# Patient Record
Sex: FEMALE | Race: WHITE | NOT HISPANIC OR LATINO | Employment: FULL TIME | URBAN - NONMETROPOLITAN AREA
[De-identification: names, ages, dates, MRNs, and addresses within clinical notes are randomized per-mention and may not be internally consistent; named-entity substitution may affect disease eponyms.]

---

## 2017-11-27 ENCOUNTER — TRANSCRIBE ORDERS (OUTPATIENT)
Dept: ADMINISTRATIVE | Facility: HOSPITAL | Age: 47
End: 2017-11-27

## 2017-11-27 DIAGNOSIS — R92.8 ABNORMAL MAMMOGRAM: Primary | ICD-10-CM

## 2017-12-20 ENCOUNTER — HOSPITAL ENCOUNTER (OUTPATIENT)
Dept: ULTRASOUND IMAGING | Facility: HOSPITAL | Age: 47
Discharge: HOME | End: 2017-12-20

## 2017-12-20 ENCOUNTER — HOSPITAL ENCOUNTER (OUTPATIENT)
Dept: MAMMOGRAPHY | Facility: HOSPITAL | Age: 47
Discharge: HOME | End: 2017-12-20
Admitting: ADVANCED PRACTICE MIDWIFE

## 2017-12-20 DIAGNOSIS — R92.8 ABNORMAL MAMMOGRAM: ICD-10-CM

## 2017-12-20 PROCEDURE — 76642 ULTRASOUND BREAST LIMITED: CPT

## 2017-12-20 PROCEDURE — 76642 ULTRASOUND BREAST LIMITED: CPT | Performed by: RADIOLOGY

## 2017-12-20 PROCEDURE — G0279 TOMOSYNTHESIS, MAMMO: HCPCS

## 2017-12-20 PROCEDURE — G0206 DX MAMMO INCL CAD UNI: HCPCS

## 2017-12-20 PROCEDURE — 77061 BREAST TOMOSYNTHESIS UNI: CPT | Performed by: RADIOLOGY

## 2017-12-20 PROCEDURE — 77065 DX MAMMO INCL CAD UNI: CPT | Performed by: RADIOLOGY

## 2018-01-05 ENCOUNTER — OFFICE VISIT (OUTPATIENT)
Dept: RETAIL CLINIC | Facility: CLINIC | Age: 48
End: 2018-01-05

## 2018-01-05 VITALS
HEART RATE: 74 BPM | OXYGEN SATURATION: 99 % | TEMPERATURE: 98 F | WEIGHT: 219 LBS | HEIGHT: 67 IN | RESPIRATION RATE: 16 BRPM | BODY MASS INDEX: 34.37 KG/M2

## 2018-01-05 DIAGNOSIS — J01.10 ACUTE NON-RECURRENT FRONTAL SINUSITIS: Primary | ICD-10-CM

## 2018-01-05 PROBLEM — J06.9 URI (UPPER RESPIRATORY INFECTION): Status: ACTIVE | Noted: 2018-01-05

## 2018-01-05 PROCEDURE — 99213 OFFICE O/P EST LOW 20 MIN: CPT | Performed by: NURSE PRACTITIONER

## 2018-01-05 RX ORDER — CETIRIZINE HYDROCHLORIDE, PSEUDOEPHEDRINE HYDROCHLORIDE 5; 120 MG/1; MG/1
1 TABLET, FILM COATED, EXTENDED RELEASE ORAL 2 TIMES DAILY
Qty: 14 TABLET | Refills: 0 | Status: SHIPPED | OUTPATIENT
Start: 2018-01-05 | End: 2018-01-12

## 2018-01-05 RX ORDER — AZELASTINE 1 MG/ML
1 SPRAY, METERED NASAL 2 TIMES DAILY
Qty: 1 EACH | Refills: 0 | OUTPATIENT
Start: 2018-01-05 | End: 2019-08-24

## 2018-01-05 RX ORDER — FLUCONAZOLE 150 MG/1
150 TABLET ORAL DAILY
Qty: 2 TABLET | Refills: 0 | OUTPATIENT
Start: 2018-01-05 | End: 2019-08-24

## 2018-01-05 RX ORDER — AMOXICILLIN AND CLAVULANATE POTASSIUM 875; 125 MG/1; MG/1
1 TABLET, FILM COATED ORAL 2 TIMES DAILY
Qty: 20 TABLET | Refills: 0 | Status: SHIPPED | OUTPATIENT
Start: 2018-01-05 | End: 2019-05-09

## 2018-01-05 RX ORDER — IBUPROFEN 800 MG/1
800 TABLET ORAL 3 TIMES DAILY PRN
Qty: 30 TABLET | Refills: 0 | Status: SHIPPED | OUTPATIENT
Start: 2018-01-05

## 2018-01-05 NOTE — PATIENT INSTRUCTIONS
Sinusitis, Adult  Sinusitis is soreness and inflammation of your sinuses. Sinuses are hollow spaces in the bones around your face. They are located:  · Around your eyes.  · In the middle of your forehead.  · Behind your nose.  · In your cheekbones.  Your sinuses and nasal passages are lined with a stringy fluid (mucus). Mucus normally drains out of your sinuses. When your nasal tissues get inflamed or swollen, the mucus can get trapped or blocked so air cannot flow through your sinuses. This lets bacteria, viruses, and funguses grow, and that leads to infection.  HOME CARE  Medicines  · Take, use, or apply over-the-counter and prescription medicines only as told by your doctor. These may include nasal sprays.  · If you were prescribed an antibiotic medicine, take it as told by your doctor. Do not stop taking the antibiotic even if you start to feel better.  Hydrate and Humidify  · Drink enough water to keep your pee (urine) clear or pale yellow.  · Use a cool mist humidifier to keep the humidity level in your home above 50%.  · Breathe in steam for 10-15 minutes, 3-4 times a day or as told by your doctor. You can do this in the bathroom while a hot shower is running.  · Try not to spend time in cool or dry air.  Rest  · Rest as much as possible.    · Sleep with your head raised (elevated).  · Make sure to get enough sleep each night.  General Instructions  · Put a warm, moist washcloth on your face 3-4 times a day or as told by your doctor. This will help with discomfort.  · Wash your hands often with soap and water. If there is no soap and water, use hand .  · Do not smoke. Avoid being around people who are smoking (secondhand smoke).  · Keep all follow-up visits as told by your doctor. This is important.  GET HELP IF:  · You have a fever.  · Your symptoms get worse.  · Your symptoms do not get better within 10 days.  GET HELP RIGHT AWAY IF:  · You have a very bad headache.  · You cannot stop throwing up  (vomiting).  · You have pain or swelling around your face or eyes.  · You have trouble seeing.  · You feel confused.  · Your neck is stiff.  · You have trouble breathing.     This information is not intended to replace advice given to you by your health care provider. Make sure you discuss any questions you have with your health care provider.     Document Released: 06/05/2009 Document Revised: 04/10/2017 Document Reviewed: 10/12/2016  [employer] Interactive Patient Education ©2017 [employer]

## 2018-01-05 NOTE — PROGRESS NOTES
"  HPI Comments: Debbie presents to the clinic today C/O upper respiratory symptoms which started appx one week ago. Associated symptoms include sinus pressure/pain, nasal congestion and swelling of her right lower jaw which she noticed yesterday. She has tried switching from Zyrtec to Claritin without adequate relief. Refer to ROS for additional information.    URI    The current episode started in the past 7 days. The problem has been gradually worsening. There has been no fever. Associated symptoms include congestion, ear pain, rhinorrhea and sinus pain. Pertinent negatives include no coughing, headaches, nausea, rash, sore throat, vomiting or wheezing. She has tried antihistamine for the symptoms. The treatment provided no relief.      Vitals:    01/05/18 0900   Pulse: 74   Resp: 16   Temp: 98 °F (36.7 °C)   TempSrc: Oral   SpO2: 99%   Weight: 99.3 kg (219 lb)   Height: 170.2 cm (67\")      The following portions of the patient's history were reviewed and updated as appropriate: allergies, current medications, past family history, past medical history, past social history, past surgical history and problem list.    Review of Systems   Constitutional: Negative for appetite change, chills, fatigue and fever.   HENT: Positive for congestion, ear pain, postnasal drip, rhinorrhea, sinus pain and sinus pressure. Negative for sore throat.    Eyes: Negative for discharge and redness.   Respiratory: Negative for cough, chest tightness and wheezing.    Gastrointestinal: Negative for nausea and vomiting.   Musculoskeletal: Negative for neck stiffness.   Skin: Negative for color change and rash.   Neurological: Negative for dizziness, light-headedness and headaches.   Hematological: Positive for adenopathy.   All other systems reviewed and are negative.    Physical Exam   Constitutional: She is oriented to person, place, and time. She appears well-developed and well-nourished. No distress.   HENT:   Head: Normocephalic. "   Right Ear: Ear canal normal. No mastoid tenderness. Tympanic membrane is bulging. Tympanic membrane is not erythematous. A middle ear effusion is present.   Left Ear: Ear canal normal. No mastoid tenderness. Tympanic membrane is bulging. Tympanic membrane is not erythematous. A middle ear effusion is present.   Nose: Mucosal edema, rhinorrhea and sinus tenderness present. Right sinus exhibits frontal sinus tenderness. Right sinus exhibits no maxillary sinus tenderness. Left sinus exhibits frontal sinus tenderness. Left sinus exhibits no maxillary sinus tenderness.   Mouth/Throat: No oropharyngeal exudate or posterior oropharyngeal erythema.   Eyes: Conjunctivae are normal. Pupils are equal, round, and reactive to light. Right eye exhibits no discharge. Left eye exhibits no discharge. No scleral icterus.   Neck: Neck supple. No tracheal tenderness present.   Cardiovascular: Normal rate, regular rhythm and normal heart sounds.  Exam reveals no friction rub.    No murmur heard.  Pulmonary/Chest: Effort normal and breath sounds normal. No respiratory distress. She has no wheezes. She has no rales.   Lymphadenopathy:        Head (right side): Submandibular adenopathy present. No tonsillar and no preauricular adenopathy present.        Head (left side): No tonsillar and no preauricular adenopathy present.     She has no cervical adenopathy.   Neurological: She is alert and oriented to person, place, and time.   Skin: Skin is warm and dry. No rash noted. No erythema.   Psychiatric: She has a normal mood and affect. Her behavior is normal. Judgment and thought content normal.   Vitals reviewed.      Assessment/Plan   Problems Addressed this Visit     None      Visit Diagnoses     Acute non-recurrent frontal sinusitis    -  Primary    Findings and recommendations discussed with Debbie. Treatment options reviewed. Counseled regarding supportive care measures.     Relevant Medications    amoxicillin-clavulanate (AUGMENTIN)  875-125 MG per tablet    ibuprofen (ADVIL,MOTRIN) 800 MG tablet    azelastine (ASTELIN) 0.1 % nasal spray    cetirizine-pseudoephedrine (ZyrTEC-D) 5-120 MG per 12 hr tablet        Findings and recommendations discussed with Debbie. Treatment options reviewed. Counseled regarding supportive care measures. S/S of concern reviewed and if occur to seek further medical evaluation or if symptoms worsen or do not improve within 48-72 hours.

## 2018-08-22 ENCOUNTER — TRANSCRIBE ORDERS (OUTPATIENT)
Dept: ADMINISTRATIVE | Facility: HOSPITAL | Age: 48
End: 2018-08-22

## 2018-08-22 DIAGNOSIS — R10.9 ABDOMINAL PAIN, UNSPECIFIED ABDOMINAL LOCATION: Primary | ICD-10-CM

## 2018-08-24 ENCOUNTER — TRANSCRIBE ORDERS (OUTPATIENT)
Dept: ADMINISTRATIVE | Facility: HOSPITAL | Age: 48
End: 2018-08-24

## 2018-08-24 ENCOUNTER — HOSPITAL ENCOUNTER (OUTPATIENT)
Dept: ULTRASOUND IMAGING | Facility: HOSPITAL | Age: 48
Discharge: HOME | End: 2018-08-24
Admitting: NURSE PRACTITIONER

## 2018-08-24 DIAGNOSIS — R10.9 ABDOMINAL PAIN, UNSPECIFIED ABDOMINAL LOCATION: Primary | ICD-10-CM

## 2018-08-24 DIAGNOSIS — R10.9 ABDOMINAL PAIN, UNSPECIFIED ABDOMINAL LOCATION: ICD-10-CM

## 2018-08-24 PROCEDURE — 76700 US EXAM ABDOM COMPLETE: CPT | Performed by: RADIOLOGY

## 2018-08-24 PROCEDURE — 76700 US EXAM ABDOM COMPLETE: CPT

## 2018-08-26 ENCOUNTER — HOSPITAL ENCOUNTER (OUTPATIENT)
Dept: NUCLEAR MEDICINE | Facility: HOSPITAL | Age: 48
Discharge: HOME | End: 2018-08-26

## 2018-08-26 DIAGNOSIS — R10.9 ABDOMINAL PAIN, UNSPECIFIED ABDOMINAL LOCATION: ICD-10-CM

## 2018-08-26 PROCEDURE — A9537 TC99M MEBROFENIN: HCPCS | Performed by: NURSE PRACTITIONER

## 2018-08-26 PROCEDURE — 78226 HEPATOBILIARY SYSTEM IMAGING: CPT | Performed by: RADIOLOGY

## 2018-08-26 PROCEDURE — 0 TECHNETIUM TC 99M MEBROFENIN KIT: Performed by: NURSE PRACTITIONER

## 2018-08-26 PROCEDURE — 78226 HEPATOBILIARY SYSTEM IMAGING: CPT

## 2018-08-26 RX ORDER — KIT FOR THE PREPARATION OF TECHNETIUM TC 99M MEBROFENIN 45 MG/10ML
1 INJECTION, POWDER, LYOPHILIZED, FOR SOLUTION INTRAVENOUS
Status: COMPLETED | OUTPATIENT
Start: 2018-08-26 | End: 2018-08-26

## 2018-08-26 RX ADMIN — MEBROFENIN 1 DOSE: 45 INJECTION, POWDER, LYOPHILIZED, FOR SOLUTION INTRAVENOUS at 16:11

## 2019-05-09 ENCOUNTER — OFFICE VISIT (OUTPATIENT)
Dept: RETAIL CLINIC | Facility: CLINIC | Age: 49
End: 2019-05-09

## 2019-05-09 VITALS
HEART RATE: 74 BPM | TEMPERATURE: 98 F | OXYGEN SATURATION: 98 % | BODY MASS INDEX: 34.27 KG/M2 | RESPIRATION RATE: 18 BRPM | WEIGHT: 218.8 LBS

## 2019-05-09 DIAGNOSIS — N30.90 CYSTITIS: Primary | ICD-10-CM

## 2019-05-09 LAB
BILIRUB BLD-MCNC: NEGATIVE MG/DL
CLARITY, POC: CLEAR
COLOR UR: YELLOW
GLUCOSE UR STRIP-MCNC: NEGATIVE MG/DL
KETONES UR QL: NEGATIVE
LEUKOCYTE EST, POC: NEGATIVE
NITRITE UR-MCNC: NEGATIVE MG/ML
PH UR: 5.5 [PH] (ref 5–8)
PROT UR STRIP-MCNC: NEGATIVE MG/DL
RBC # UR STRIP: NEGATIVE /UL
SP GR UR: 1.01 (ref 1–1.03)
UROBILINOGEN UR QL: NORMAL

## 2019-05-09 PROCEDURE — 81003 URINALYSIS AUTO W/O SCOPE: CPT | Performed by: NURSE PRACTITIONER

## 2019-05-09 PROCEDURE — 99213 OFFICE O/P EST LOW 20 MIN: CPT | Performed by: NURSE PRACTITIONER

## 2019-05-09 RX ORDER — PHENAZOPYRIDINE HYDROCHLORIDE 200 MG/1
200 TABLET, FILM COATED ORAL 3 TIMES DAILY PRN
Qty: 9 TABLET | Refills: 0 | OUTPATIENT
Start: 2019-05-09 | End: 2019-08-24

## 2019-05-09 NOTE — PROGRESS NOTES
SHANKAR@  Debbie Lanier is a 48 y.o. female.   Chief Complaint   Patient presents with   • Urinary Tract Infection      Urinary Tract Infection    This is a new problem. The current episode started in the past 7 days. The problem occurs intermittently. The problem has been waxing and waning. The quality of the pain is described as burning. The pain is mild. There has been no fever. There is no history of pyelonephritis. Associated symptoms include frequency. Pertinent negatives include no chills, discharge, flank pain, hematuria, nausea, possible pregnancy, urgency or vomiting. Treatments tried: OTC AZO. The treatment provided mild relief. There is no history of kidney stones or recurrent UTIs.   Reports last UTI > 6 months ago.     The following portions of the patient's history were reviewed and updated as appropriate: allergies, current medications, past family history, past medical history, past social history, past surgical history and problem list.    Current Outpatient Medications:   •  azelastine (ASTELIN) 0.1 % nasal spray, 1 spray into each nostril 2 (Two) Times a Day., Disp: 1 each, Rfl: 0  •  escitalopram (LEXAPRO) 10 MG tablet, Take 10 mg by mouth daily., Disp: , Rfl:   •  fluconazole (DIFLUCAN) 150 MG tablet, Take 1 tablet by mouth Daily., Disp: 2 tablet, Rfl: 0  •  fluticasone-salmeterol (ADVAIR) 100-50 MCG/DOSE DISKUS, Inhale 2 (two) times a day., Disp: , Rfl:   •  ibuprofen (ADVIL,MOTRIN) 800 MG tablet, Take 1 tablet by mouth 3 (Three) Times a Day As Needed for Mild Pain , Moderate Pain  or Fever., Disp: 30 tablet, Rfl: 0  •  montelukast (SINGULAIR) 10 MG tablet, Take 10 mg by mouth every night., Disp: , Rfl:   •  phenazopyridine (PYRIDIUM) 200 MG tablet, Take 1 tablet by mouth 3 (Three) Times a Day As Needed for bladder spasms., Disp: 9 tablet, Rfl: 0    Allergies   Allergen Reactions   • Indocin [Indomethacin] Itching   • Tramadol Itching     Review of Systems   Constitutional:  Negative for activity change, chills, fatigue and fever.   Respiratory: Negative for cough.    Cardiovascular: Negative for leg swelling.   Gastrointestinal: Negative for abdominal distention, abdominal pain, diarrhea, nausea and vomiting.   Genitourinary: Positive for decreased urine volume, difficulty urinating, dysuria and frequency. Negative for enuresis, flank pain, hematuria, urgency and vaginal discharge.   Musculoskeletal: Negative for back pain.   Skin: Negative for color change, pallor and rash.   Neurological: Negative for dizziness, light-headedness and headaches.   Psychiatric/Behavioral: Negative for sleep disturbance.     Objective     Visit Vitals  Pulse 74   Temp 98 °F (36.7 °C)   Resp 18   Wt 99.2 kg (218 lb 12.8 oz)   SpO2 98%   BMI 34.27 kg/m²     Physical Exam   Constitutional: She is oriented to person, place, and time. She appears well-developed and well-nourished.   Cardiovascular: Normal rate and regular rhythm.   Pulmonary/Chest: Effort normal and breath sounds normal.   Abdominal: Soft. Bowel sounds are normal. She exhibits no distension. There is no tenderness. There is no rebound, no guarding and no CVA tenderness.   Musculoskeletal: Normal range of motion.   Lymphadenopathy:     She has no cervical adenopathy.   Neurological: She is alert and oriented to person, place, and time.   Skin: Skin is warm and dry.   Psychiatric: She has a normal mood and affect. Her behavior is normal.     Lab Results (last 24 hours)     Procedure Component Value Units Date/Time    POC Urinalysis Dipstick, Automated [[id number]]  (Normal) Collected:  05/09/19 1845    Specimen:  Urine Updated:  05/09/19 1846     Color Yellow     Clarity, UA Clear     Specific Gravity  1.015     pH, Urine 5.5     Leukocytes Negative     Nitrite, UA Negative     Protein, POC Negative mg/dL      Glucose, UA Negative mg/dL      Ketones, UA Negative     Urobilinogen, UA Normal     Bilirubin Negative     Blood, UA Negative         Assessment/Plan   Debbie was seen today for urinary tract infection.    Diagnoses and all orders for this visit:    Cystitis  -     phenazopyridine (PYRIDIUM) 200 MG tablet; Take 1 tablet by mouth 3 (Three) Times a Day As Needed for bladder spasms.  -     POC Urinalysis Dipstick, Automated                 Discussed results of the POC UA results. AVS reviewed, understanding verbalized and agrees with treatment plan.  Follow up with primary care provider if no improvement within next 7-10 days. Go to [facility] or ER if condition worsens.

## 2019-05-09 NOTE — PATIENT INSTRUCTIONS
Interstitial Cystitis  Interstitial cystitis is a condition that causes inflammation of the bladder. The bladder is a hollow organ in the lower part of your abdomen. It stores urine after the urine is made by your kidneys. With interstitial cystitis, you may have pain in the bladder area. You may also have a frequent and urgent need to urinate.  The severity of interstitial cystitis can vary from person to person. You may have flare-ups of the condition, and then it may go away for a while. For many people who have this condition, it becomes a long-term problem.  What are the causes?  The cause of this condition is not known.  What increases the risk?  This condition is more likely to develop in women.  What are the signs or symptoms?  Symptoms of interstitial cystitis vary, and they can change over time. Symptoms may include:  · Discomfort or pain in the bladder area. This can range from mild to severe. The pain may change in intensity as the bladder fills with urine or as it empties.  · Pelvic pain.  · An urgent need to urinate.  · Frequent urination.  · Pain during sexual intercourse.  · Pinpoint bleeding on the bladder wall.    For women, the symptoms often get worse during menstruation.  How is this diagnosed?  This condition is diagnosed by evaluating your symptoms and ruling out other causes. A physical exam will be done. Various tests may be done to rule out other conditions. Common tests include:  · Urine tests.  · Cystoscopy. In this test, a tool that is like a very thin telescope is used to look into your bladder.  · Biopsy. This involves taking a sample of tissue from the bladder wall to be examined under a microscope.    How is this treated?  There is no cure for interstitial cystitis, but treatment methods are available to control your symptoms. Work closely with your health care provider to find the treatments that will be most effective for you. Treatment options may include:  · Medicines to relieve  pain and to help reduce the number of times that you feel the need to urinate.  · Bladder training. This involves learning ways to control when you urinate, such as:  ? Urinating at scheduled times.  ? Training yourself to delay urination.  ? Doing exercises (Kegel exercises) to strengthen the muscles that control urine flow.  · Lifestyle changes, such as changing your diet or taking steps to control stress.  · Use of a device that provides electrical stimulation in order to reduce pain.  · A procedure that stretches your bladder by filling it with air or fluid.  · Surgery. This is rare. It is only done for extreme cases if other treatments do not help.    Follow these instructions at home:  · Take medicines only as directed by your health care provider.  · Use bladder training techniques as directed.  ? Keep a bladder diary to find out which foods, liquids, or activities make your symptoms worse.  ? Use your bladder diary to schedule bathroom trips. If you are away from home, plan to be near a bathroom at each of your scheduled times.  ? Make sure you urinate just before you leave the house and just before you go to bed.  · Do Kegel exercises as directed by your health care provider.  · Do not drink alcohol.  · Do not use any tobacco products, including cigarettes, chewing tobacco, or electronic cigarettes. If you need help quitting, ask your health care provider.  · Make dietary changes as directed by your health care provider. You may need to avoid spicy foods and foods that contain a high amount of potassium.  · Limit your drinking of beverages that stimulate urination. These include soda, coffee, and tea.  · Keep all follow-up visits as directed by your health care provider. This is important.  Contact a health care provider if:  · Your symptoms do not get better after treatment.  · Your pain and discomfort are getting worse.  · You have more frequent urges to urinate.  · You have a fever.  Get help right away  if:  · You are not able to control your bladder at all.  This information is not intended to replace advice given to you by your health care provider. Make sure you discuss any questions you have with your health care provider.  Document Released: 08/18/2005 Document Revised: 05/25/2017 Document Reviewed: 08/25/2015  [employer] Interactive Patient Education © 2018 [employer]

## 2019-06-05 ENCOUNTER — HOSPITAL ENCOUNTER (OUTPATIENT)
Dept: MAMMOGRAPHY | Facility: HOSPITAL | Age: 49
Discharge: HOME | End: 2019-06-05
Admitting: ADVANCED PRACTICE MIDWIFE

## 2019-06-05 DIAGNOSIS — Z12.39 SCREENING BREAST EXAMINATION: ICD-10-CM

## 2019-06-05 PROCEDURE — 77067 SCR MAMMO BI INCL CAD: CPT

## 2019-06-05 PROCEDURE — 77063 BREAST TOMOSYNTHESIS BI: CPT | Performed by: RADIOLOGY

## 2019-06-05 PROCEDURE — 77067 SCR MAMMO BI INCL CAD: CPT | Performed by: RADIOLOGY

## 2019-06-05 PROCEDURE — 77063 BREAST TOMOSYNTHESIS BI: CPT

## 2019-08-24 ENCOUNTER — APPOINTMENT (OUTPATIENT)
Dept: CT IMAGING | Facility: HOSPITAL | Age: 49
End: 2019-08-24

## 2019-08-24 ENCOUNTER — APPOINTMENT (OUTPATIENT)
Dept: GENERAL RADIOLOGY | Facility: HOSPITAL | Age: 49
End: 2019-08-24

## 2019-08-24 ENCOUNTER — HOSPITAL ENCOUNTER (EMERGENCY)
Facility: HOSPITAL | Age: 49
Discharge: HOME | End: 2019-08-24
Attending: EMERGENCY MEDICINE | Admitting: EMERGENCY MEDICINE

## 2019-08-24 VITALS
RESPIRATION RATE: 18 BRPM | HEIGHT: 67 IN | BODY MASS INDEX: 33.59 KG/M2 | SYSTOLIC BLOOD PRESSURE: 124 MMHG | HEART RATE: 75 BPM | DIASTOLIC BLOOD PRESSURE: 74 MMHG | OXYGEN SATURATION: 98 % | TEMPERATURE: 97.8 F | WEIGHT: 214 LBS

## 2019-08-24 DIAGNOSIS — S20.212A CHEST WALL CONTUSION, LEFT, INITIAL ENCOUNTER: ICD-10-CM
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
## 2019-08-24 DIAGNOSIS — S80.11XA CONTUSION OF RIGHT LOWER LEG, INITIAL ENCOUNTER: ICD-10-CM

## 2019-08-24 LAB
ALBUMIN SERPL-MCNC: 4.46 G/DL (ref 3.5–5.2)
ALBUMIN/GLOB SERPL: 1.2 G/DL
ALP SERPL-CCNC: 52 U/L (ref 39–117)
ALT SERPL W P-5'-P-CCNC: 23 U/L (ref 1–33)
ANION GAP SERPL CALCULATED.3IONS-SCNC: 12.2 MMOL/L (ref 5–15)
AST SERPL-CCNC: 26 U/L (ref 1–32)
BASOPHILS # BLD AUTO: 0.04 10*3/MM3 (ref 0–0.2)
BASOPHILS NFR BLD AUTO: 0.4 % (ref 0–1.5)
BILIRUB SERPL-MCNC: 0.5 MG/DL (ref 0.2–1.2)
BILIRUB UR QL STRIP: NEGATIVE
BUN BLD-MCNC: 11 MG/DL (ref 6–20)
BUN/CREAT SERPL: 12.4 (ref 7–25)
CALCIUM SPEC-SCNC: 9.3 MG/DL (ref 8.6–10.5)
CHLORIDE SERPL-SCNC: 103 MMOL/L (ref 98–107)
CLARITY UR: CLEAR
CO2 SERPL-SCNC: 24.8 MMOL/L (ref 22–29)
COLOR UR: YELLOW
CREAT BLD-MCNC: 0.89 MG/DL (ref 0.57–1)
DEPRECATED RDW RBC AUTO: 43.2 FL (ref 37–54)
EOSINOPHIL # BLD AUTO: 0.19 10*3/MM3 (ref 0–0.4)
EOSINOPHIL NFR BLD AUTO: 2 % (ref 0.3–6.2)
ERYTHROCYTE [DISTWIDTH] IN BLOOD BY AUTOMATED COUNT: 14.4 % (ref 12.3–15.4)
GFR SERPL CREATININE-BSD FRML MDRD: 68 ML/MIN/1.73
GLOBULIN UR ELPH-MCNC: 3.8 GM/DL
GLUCOSE BLD-MCNC: 97 MG/DL (ref 65–99)
GLUCOSE UR STRIP-MCNC: NEGATIVE MG/DL
HCT VFR BLD AUTO: 43.8 % (ref 34–46.6)
HGB BLD-MCNC: 14 G/DL (ref 12–15.9)
HGB UR QL STRIP.AUTO: NEGATIVE
IMM GRANULOCYTES # BLD AUTO: 0.01 10*3/MM3 (ref 0–0.05)
IMM GRANULOCYTES NFR BLD AUTO: 0.1 % (ref 0–0.5)
KETONES UR QL STRIP: ABNORMAL
LEUKOCYTE ESTERASE UR QL STRIP.AUTO: NEGATIVE
LYMPHOCYTES # BLD AUTO: 1.46 10*3/MM3 (ref 0.7–3.1)
LYMPHOCYTES NFR BLD AUTO: 15.4 % (ref 19.6–45.3)
MCH RBC QN AUTO: 26.4 PG (ref 26.6–33)
MCHC RBC AUTO-ENTMCNC: 32 G/DL (ref 31.5–35.7)
MCV RBC AUTO: 82.6 FL (ref 79–97)
MONOCYTES # BLD AUTO: 0.79 10*3/MM3 (ref 0.1–0.9)
MONOCYTES NFR BLD AUTO: 8.4 % (ref 5–12)
NEUTROPHILS # BLD AUTO: 6.96 10*3/MM3 (ref 1.7–7)
NEUTROPHILS NFR BLD AUTO: 73.7 % (ref 42.7–76)
NITRITE UR QL STRIP: NEGATIVE
PH UR STRIP.AUTO: 6.5 [PH] (ref 5–8)
PLATELET # BLD AUTO: 288 10*3/MM3 (ref 140–450)
PMV BLD AUTO: 11.1 FL (ref 6–12)
POTASSIUM BLD-SCNC: 3.9 MMOL/L (ref 3.5–5.2)
PROT SERPL-MCNC: 8.3 G/DL (ref 6–8.5)
PROT UR QL STRIP: NEGATIVE
RBC # BLD AUTO: 5.3 10*6/MM3 (ref 3.77–5.28)
SODIUM BLD-SCNC: 140 MMOL/L (ref 136–145)
SP GR UR STRIP: 1.01 (ref 1–1.03)
UROBILINOGEN UR QL STRIP: ABNORMAL
WBC NRBC COR # BLD: 9.45 10*3/MM3 (ref 3.4–10.8)

## 2019-08-24 PROCEDURE — 74177 CT ABD & PELVIS W/CONTRAST: CPT

## 2019-08-24 PROCEDURE — 70450 CT HEAD/BRAIN W/O DYE: CPT

## 2019-08-24 PROCEDURE — 73590 X-RAY EXAM OF LOWER LEG: CPT

## 2019-08-24 PROCEDURE — 93010 ELECTROCARDIOGRAM REPORT: CPT | Performed by: INTERNAL MEDICINE

## 2019-08-24 PROCEDURE — 72128 CT CHEST SPINE W/O DYE: CPT

## 2019-08-24 PROCEDURE — 72125 CT NECK SPINE W/O DYE: CPT

## 2019-08-24 PROCEDURE — 93005 ELECTROCARDIOGRAM TRACING: CPT | Performed by: PHYSICIAN ASSISTANT

## 2019-08-24 PROCEDURE — 85025 COMPLETE CBC W/AUTO DIFF WBC: CPT | Performed by: PHYSICIAN ASSISTANT

## 2019-08-24 PROCEDURE — 71045 X-RAY EXAM CHEST 1 VIEW: CPT

## 2019-08-24 PROCEDURE — 99284 EMERGENCY DEPT VISIT MOD MDM: CPT

## 2019-08-24 PROCEDURE — 72131 CT LUMBAR SPINE W/O DYE: CPT

## 2019-08-24 PROCEDURE — 0 IOVERSOL 68 % SOLUTION: Performed by: EMERGENCY MEDICINE

## 2019-08-24 PROCEDURE — 80053 COMPREHEN METABOLIC PANEL: CPT | Performed by: PHYSICIAN ASSISTANT

## 2019-08-24 PROCEDURE — 81003 URINALYSIS AUTO W/O SCOPE: CPT | Performed by: PHYSICIAN ASSISTANT

## 2019-08-24 PROCEDURE — 71275 CT ANGIOGRAPHY CHEST: CPT

## 2019-08-24 RX ORDER — HYDROCODONE BITARTRATE AND ACETAMINOPHEN 5; 325 MG/1; MG/1
1 TABLET ORAL EVERY 6 HOURS PRN
Qty: 8 TABLET | Refills: 0 | Status: SHIPPED | OUTPATIENT
Start: 2019-08-24 | End: 2019-08-27

## 2019-08-24 RX ORDER — ACETAMINOPHEN 500 MG
500 TABLET ORAL ONCE
Status: COMPLETED | OUTPATIENT
Start: 2019-08-24 | End: 2019-08-24

## 2019-08-24 RX ORDER — METHOCARBAMOL 500 MG/1
500 TABLET, FILM COATED ORAL 4 TIMES DAILY PRN
Qty: 28 TABLET | Refills: 0 | Status: SHIPPED | OUTPATIENT
Start: 2019-08-24

## 2019-08-24 RX ADMIN — ACETAMINOPHEN 500 MG: 500 TABLET ORAL at 15:10

## 2019-08-24 RX ADMIN — IOVERSOL 100 ML: 678 INJECTION INTRA-ARTERIAL; INTRAVENOUS at 14:46

## 2019-10-01 ENCOUNTER — TRANSCRIBE ORDERS (OUTPATIENT)
Dept: ADMINISTRATIVE | Facility: HOSPITAL | Age: 49
End: 2019-10-01

## 2019-10-01 DIAGNOSIS — R06.02 SHORTNESS OF BREATH: Primary | ICD-10-CM

## 2020-07-30 ENCOUNTER — HOSPITAL ENCOUNTER (OUTPATIENT)
Dept: MAMMOGRAPHY | Facility: HOSPITAL | Age: 50
Discharge: HOME | End: 2020-07-30
Admitting: FAMILY MEDICINE

## 2020-07-30 DIAGNOSIS — Z12.31 VISIT FOR SCREENING MAMMOGRAM: ICD-10-CM

## 2020-07-30 PROCEDURE — 77063 BREAST TOMOSYNTHESIS BI: CPT

## 2020-07-30 PROCEDURE — 77067 SCR MAMMO BI INCL CAD: CPT

## 2020-07-30 PROCEDURE — 77063 BREAST TOMOSYNTHESIS BI: CPT | Performed by: RADIOLOGY

## 2020-07-30 PROCEDURE — 77067 SCR MAMMO BI INCL CAD: CPT | Performed by: RADIOLOGY

## 2021-03-18 ENCOUNTER — BULK ORDERING (OUTPATIENT)
Dept: CASE MANAGEMENT | Facility: OTHER | Age: 51
End: 2021-03-18

## 2021-03-18 DIAGNOSIS — Z23 IMMUNIZATION DUE: ICD-10-CM

## 2021-08-31 ENCOUNTER — HOSPITAL ENCOUNTER (OUTPATIENT)
Dept: MAMMOGRAPHY | Facility: HOSPITAL | Age: 51
Discharge: HOME | End: 2021-08-31
Admitting: FAMILY MEDICINE

## 2021-08-31 DIAGNOSIS — Z12.31 VISIT FOR SCREENING MAMMOGRAM: ICD-10-CM

## 2021-08-31 PROCEDURE — 77063 BREAST TOMOSYNTHESIS BI: CPT

## 2021-08-31 PROCEDURE — 77063 BREAST TOMOSYNTHESIS BI: CPT | Performed by: RADIOLOGY

## 2021-08-31 PROCEDURE — 77067 SCR MAMMO BI INCL CAD: CPT

## 2021-08-31 PROCEDURE — 77067 SCR MAMMO BI INCL CAD: CPT | Performed by: RADIOLOGY

## 2022-09-22 ENCOUNTER — HOSPITAL ENCOUNTER (OUTPATIENT)
Dept: MAMMOGRAPHY | Facility: HOSPITAL | Age: 52
Discharge: HOME | End: 2022-09-22
Admitting: FAMILY MEDICINE

## 2022-09-22 DIAGNOSIS — Z12.31 VISIT FOR SCREENING MAMMOGRAM: ICD-10-CM

## 2022-09-22 PROCEDURE — 77067 SCR MAMMO BI INCL CAD: CPT

## 2022-09-22 PROCEDURE — 77063 BREAST TOMOSYNTHESIS BI: CPT | Performed by: RADIOLOGY

## 2022-09-22 PROCEDURE — 77067 SCR MAMMO BI INCL CAD: CPT | Performed by: RADIOLOGY

## 2022-09-22 PROCEDURE — 77063 BREAST TOMOSYNTHESIS BI: CPT

## 2023-01-05 ENCOUNTER — HOSPITAL ENCOUNTER (OUTPATIENT)
Dept: GENERAL RADIOLOGY | Facility: HOSPITAL | Age: 53
Discharge: HOME | End: 2023-01-05
Admitting: NURSE PRACTITIONER
Payer: COMMERCIAL

## 2023-01-05 ENCOUNTER — TRANSCRIBE ORDERS (OUTPATIENT)
Dept: ADMINISTRATIVE | Facility: HOSPITAL | Age: 53
End: 2023-01-05
Payer: COMMERCIAL

## 2023-01-05 DIAGNOSIS — U09.9 POST-COVID SYNDROME: ICD-10-CM

## 2023-01-05 DIAGNOSIS — R05.3 CHRONIC COUGH: ICD-10-CM

## 2023-01-05 DIAGNOSIS — J45.40 ASTHMA, MODERATE PERSISTENT, WELL-CONTROLLED: ICD-10-CM

## 2023-01-05 DIAGNOSIS — R05.9 COUGH IN ADULT: Primary | ICD-10-CM

## 2023-01-05 DIAGNOSIS — R05.9 COUGH IN ADULT: ICD-10-CM

## 2023-01-05 PROCEDURE — 71046 X-RAY EXAM CHEST 2 VIEWS: CPT | Performed by: RADIOLOGY

## 2023-01-05 PROCEDURE — 71046 X-RAY EXAM CHEST 2 VIEWS: CPT

## 2023-02-03 ENCOUNTER — TRANSCRIBE ORDERS (OUTPATIENT)
Dept: ADMINISTRATIVE | Facility: HOSPITAL | Age: 53
End: 2023-02-03
Payer: COMMERCIAL

## 2023-02-03 DIAGNOSIS — Z78.0 MENOPAUSE: ICD-10-CM

## 2023-02-03 DIAGNOSIS — Z12.31 ENCOUNTER FOR SCREENING MAMMOGRAM FOR MALIGNANT NEOPLASM OF BREAST: Primary | ICD-10-CM

## 2023-02-20 ENCOUNTER — HOSPITAL ENCOUNTER (OUTPATIENT)
Dept: BONE DENSITY | Facility: HOSPITAL | Age: 53
Discharge: HOME | End: 2023-02-20
Admitting: NURSE PRACTITIONER
Payer: COMMERCIAL

## 2023-02-20 DIAGNOSIS — Z78.0 MENOPAUSE: ICD-10-CM

## 2023-02-20 PROCEDURE — 77080 DXA BONE DENSITY AXIAL: CPT

## 2023-02-20 PROCEDURE — 77080 DXA BONE DENSITY AXIAL: CPT | Performed by: RADIOLOGY

## 2023-08-17 ENCOUNTER — OFFICE VISIT (OUTPATIENT)
Dept: SURGERY | Facility: CLINIC | Age: 53
End: 2023-08-17
Payer: COMMERCIAL

## 2023-08-17 VITALS — HEIGHT: 67 IN | WEIGHT: 223 LBS | BODY MASS INDEX: 35 KG/M2

## 2023-08-17 DIAGNOSIS — R94.8 ABNORMAL BILIARY HIDA SCAN: Primary | ICD-10-CM

## 2023-08-17 PROCEDURE — 99203 OFFICE O/P NEW LOW 30 MIN: CPT | Performed by: SURGERY

## 2023-08-17 RX ORDER — TRIAMCINOLONE ACETONIDE 0.25 MG/G
1 CREAM TOPICAL 2 TIMES DAILY
COMMUNITY

## 2023-08-17 RX ORDER — ERGOCALCIFEROL (VITAMIN D2) 10 MCG
400 TABLET ORAL DAILY
COMMUNITY

## 2023-08-17 RX ORDER — ZOLMITRIPTAN 5 MG/1
SPRAY NASAL AS NEEDED
COMMUNITY

## 2023-08-17 RX ORDER — AZELASTINE 1 MG/ML
2 SPRAY, METERED NASAL 2 TIMES DAILY
COMMUNITY

## 2023-08-17 RX ORDER — FEXOFENADINE HCL 180 MG/1
180 TABLET ORAL DAILY
COMMUNITY

## 2023-08-17 RX ORDER — PANTOPRAZOLE SODIUM 20 MG/1
20 TABLET, DELAYED RELEASE ORAL DAILY
COMMUNITY

## 2023-08-17 RX ORDER — BUDESONIDE AND FORMOTEROL FUMARATE DIHYDRATE 80; 4.5 UG/1; UG/1
2 AEROSOL RESPIRATORY (INHALATION)
COMMUNITY

## 2023-08-17 RX ORDER — ONDANSETRON HYDROCHLORIDE 8 MG/1
TABLET, FILM COATED ORAL EVERY 8 HOURS PRN
COMMUNITY

## 2023-08-17 RX ORDER — ORPHENADRINE CITRATE 100 MG/1
100 TABLET, EXTENDED RELEASE ORAL 2 TIMES DAILY
COMMUNITY

## 2023-08-17 RX ORDER — LOSARTAN POTASSIUM 25 MG/1
25 TABLET ORAL DAILY
COMMUNITY

## 2023-08-17 RX ORDER — PROMETHAZINE HYDROCHLORIDE 12.5 MG/1
12.5 TABLET ORAL EVERY 6 HOURS PRN
COMMUNITY

## 2023-08-17 RX ORDER — LEVALBUTEROL TARTRATE 45 UG/1
1-2 AEROSOL, METERED ORAL EVERY 4 HOURS PRN
COMMUNITY

## 2023-08-17 RX ORDER — DUPILUMAB 300 MG/2ML
INJECTION, SOLUTION SUBCUTANEOUS
COMMUNITY

## 2023-08-17 RX ORDER — BUDESONIDE, GLYCOPYRROLATE, AND FORMOTEROL FUMARATE 160; 9; 4.8 UG/1; UG/1; UG/1
2 AEROSOL, METERED RESPIRATORY (INHALATION) 2 TIMES DAILY
COMMUNITY

## 2023-08-17 NOTE — PROGRESS NOTES
Subjective   Debbie Lanier is a 53 y.o. female.     Chief Complaint: abnomal HIDA    History of Present Illness She is a 52 yo who has had RUQ pain after eating for some time. EGD showed reflux and colon was ok.     The following portions of the patient's history were reviewed and updated as appropriate: current medications, past family history, past medical history, past social history, past surgical history and problem list.    Review of Systems   Constitutional:  Negative for activity change, appetite change, chills, fever and unexpected weight change.   HENT:  Negative for congestion, facial swelling and sore throat.    Eyes:  Negative for photophobia and visual disturbance.   Respiratory:  Negative for chest tightness, shortness of breath and wheezing.    Cardiovascular:  Negative for chest pain, palpitations and leg swelling.   Gastrointestinal:  Positive for abdominal pain and nausea. Negative for abdominal distention, anal bleeding, blood in stool, constipation, diarrhea, rectal pain and vomiting.   Endocrine: Negative for cold intolerance, heat intolerance, polydipsia and polyuria.   Genitourinary:  Negative for difficulty urinating, dysuria, flank pain and urgency.   Musculoskeletal:  Negative for back pain and myalgias.   Skin:  Negative for rash and wound.   Allergic/Immunologic: Negative for immunocompromised state.   Neurological:  Negative for dizziness, seizures, syncope, light-headedness, numbness and headaches.   Hematological:  Negative for adenopathy. Does not bruise/bleed easily.   Psychiatric/Behavioral:  Negative for behavioral problems and confusion. The patient is not nervous/anxious.      Objective   Physical Exam  Vitals reviewed.   Constitutional:       General: She is not in acute distress.     Appearance: She is well-developed. She is not ill-appearing.   HENT:      Head: Normocephalic. No laceration. Hair is normal.      Right Ear: Hearing and ear canal normal.      Left Ear:  Hearing and ear canal normal.      Nose: Nose normal.      Right Sinus: No maxillary sinus tenderness or frontal sinus tenderness.      Left Sinus: No maxillary sinus tenderness or frontal sinus tenderness.   Eyes:      General: Lids are normal.      Conjunctiva/sclera: Conjunctivae normal.      Pupils: Pupils are equal, round, and reactive to light.   Neck:      Thyroid: No thyroid mass or thyromegaly.      Vascular: No JVD.      Trachea: No tracheal tenderness or tracheal deviation.   Cardiovascular:      Rate and Rhythm: Normal rate and regular rhythm.      Heart sounds: No murmur heard.    No gallop.   Pulmonary:      Effort: Pulmonary effort is normal.      Breath sounds: Normal breath sounds. No stridor. No wheezing.   Chest:      Chest wall: No tenderness.   Abdominal:      General: Bowel sounds are normal. There is no distension.      Palpations: Abdomen is soft. There is no mass.      Tenderness: There is abdominal tenderness. There is no guarding or rebound.      Hernia: No hernia is present.   Musculoskeletal:         General: No deformity.      Cervical back: Normal range of motion.   Lymphadenopathy:      Cervical: No cervical adenopathy.      Upper Body:      Right upper body: No supraclavicular adenopathy.      Left upper body: No supraclavicular adenopathy.   Skin:     General: Skin is warm and dry.      Coloration: Skin is not pale.      Findings: No erythema or rash.   Neurological:      Mental Status: She is alert and oriented to person, place, and time.      Motor: No abnormal muscle tone.   Psychiatric:         Behavior: Behavior normal.         Thought Content: Thought content normal.       Past Medical History:   Diagnosis Date    Asthma     Cholelithiasis 07/23    7% function    Colon polyp 05/2019    Diverticulitis of colon 05/ 2020    Mild    Fibrocystic breast 08/2019    Hypertension 11/2018    Take low does of medicine    Thyroid nodule 06/99    Right side small       Family History    Problem Relation Age of Onset    Thyroid disease Mother     Diabetes Mother         Watches diet    Depression Mother     Breast cancer Neg Hx        Social History     Tobacco Use    Smoking status: Never    Smokeless tobacco: Never   Vaping Use    Vaping Use: Never used   Substance Use Topics    Alcohol use: No    Drug use: No       Past Surgical History:   Procedure Laterality Date    D & C AND LAPAROSCOPY      HEMORRHOIDECTOMY  5/2005    TONSILLECTOMY      TUBAL ABDOMINAL LIGATION         Current Outpatient Medications   Medication Instructions    azelastine (ASTELIN) 0.1 % nasal spray 2 sprays, Nasal, 2 Times Daily, Use in each nostril as directed    Budeson-Glycopyrrol-Formoterol (Breztri Aerosphere) 160-9-4.8 MCG/ACT aerosol inhaler 2 puffs, Inhalation, 2 Times Daily    budesonide-formoterol (SYMBICORT) 80-4.5 MCG/ACT inhaler 2 puffs, Inhalation, 2 Times Daily - RT    Dupilumab (Dupixent) 300 MG/2ML solution pen-injector Subcutaneous    escitalopram (LEXAPRO) 10 mg, Oral, Daily    estradiol-norethindrone (COMBIPATCH) 0.05-0.14 MG/DAY patch 1 patch, Transdermal, 2 Times Weekly    fexofenadine (ALLEGRA) 180 mg, Oral, Daily    fluticasone-salmeterol (ADVAIR) 100-50 MCG/DOSE DISKUS Inhalation, 2 Times Daily - RT    ibuprofen (ADVIL,MOTRIN) 800 mg, Oral, 3 Times Daily PRN    levalbuterol (XOPENEX HFA) 45 MCG/ACT inhaler 1-2 puffs, Inhalation, Every 4 Hours PRN    losartan (COZAAR) 25 mg, Oral, Daily    methocarbamol (ROBAXIN) 500 mg, Oral, 4 Times Daily PRN    montelukast (SINGULAIR) 10 mg, Oral, Nightly    ondansetron (ZOFRAN) 8 MG tablet Oral, Every 8 Hours PRN    orphenadrine (NORFLEX) 100 mg, Oral, 2 Times Daily    pantoprazole (PROTONIX) 20 mg, Oral, Daily    promethazine (PHENERGAN) 12.5 mg, Oral, Every 6 Hours PRN    triamcinolone (KENALOG) 0.025 % cream 1 application , Topical, 2 Times Daily    Vitamin D (Cholecalciferol) (CHOLECALCIFEROL) 400 Units, Oral, Daily    ZOLMitriptan (Zomig) 5 MG nasal  solution Nasal, As Needed         Assessment & Plan   Diagnoses and all orders for this visit:    1. Abnormal biliary HIDA scan (Primary)    Lap jimmy

## 2023-09-05 ENCOUNTER — PRE-ADMISSION TESTING (OUTPATIENT)
Dept: PREADMISSION TESTING | Facility: HOSPITAL | Age: 53
End: 2023-09-05
Payer: COMMERCIAL

## 2023-09-05 LAB
ANION GAP SERPL CALCULATED.3IONS-SCNC: 10.4 MMOL/L (ref 5–15)
BUN SERPL-MCNC: 11 MG/DL (ref 6–20)
BUN/CREAT SERPL: 12.2 (ref 7–25)
CALCIUM SPEC-SCNC: 9 MG/DL (ref 8.6–10.5)
CHLORIDE SERPL-SCNC: 103 MMOL/L (ref 98–107)
CO2 SERPL-SCNC: 24.6 MMOL/L (ref 22–29)
CREAT SERPL-MCNC: 0.9 MG/DL (ref 0.57–1)
DEPRECATED RDW RBC AUTO: 46.2 FL (ref 37–54)
EGFRCR SERPLBLD CKD-EPI 2021: 77.1 ML/MIN/1.73
ERYTHROCYTE [DISTWIDTH] IN BLOOD BY AUTOMATED COUNT: 15.2 % (ref 12.3–15.4)
GLUCOSE SERPL-MCNC: 75 MG/DL (ref 65–99)
HCT VFR BLD AUTO: 41 % (ref 34–46.6)
HGB BLD-MCNC: 13 G/DL (ref 12–15.9)
MCH RBC QN AUTO: 26.5 PG (ref 26.6–33)
MCHC RBC AUTO-ENTMCNC: 31.7 G/DL (ref 31.5–35.7)
MCV RBC AUTO: 83.7 FL (ref 79–97)
PLATELET # BLD AUTO: 348 10*3/MM3 (ref 140–450)
PMV BLD AUTO: 11.7 FL (ref 6–12)
POTASSIUM SERPL-SCNC: 3.8 MMOL/L (ref 3.5–5.2)
RBC # BLD AUTO: 4.9 10*6/MM3 (ref 3.77–5.28)
SODIUM SERPL-SCNC: 138 MMOL/L (ref 136–145)
WBC NRBC COR # BLD: 7.64 10*3/MM3 (ref 3.4–10.8)

## 2023-09-05 PROCEDURE — 80048 BASIC METABOLIC PNL TOTAL CA: CPT

## 2023-09-05 PROCEDURE — 36415 COLL VENOUS BLD VENIPUNCTURE: CPT

## 2023-09-05 PROCEDURE — 85027 COMPLETE CBC AUTOMATED: CPT

## 2023-09-05 PROCEDURE — 93005 ELECTROCARDIOGRAM TRACING: CPT

## 2023-09-05 NOTE — DISCHARGE INSTRUCTIONS

## 2023-09-06 ENCOUNTER — ANESTHESIA EVENT (OUTPATIENT)
Dept: PERIOP | Facility: HOSPITAL | Age: 53
End: 2023-09-06
Payer: COMMERCIAL

## 2023-09-07 ENCOUNTER — HOSPITAL ENCOUNTER (OUTPATIENT)
Facility: HOSPITAL | Age: 53
Setting detail: HOSPITAL OUTPATIENT SURGERY
Discharge: HOME | End: 2023-09-07
Attending: SURGERY | Admitting: SURGERY
Payer: COMMERCIAL

## 2023-09-07 ENCOUNTER — ANESTHESIA (OUTPATIENT)
Dept: PERIOP | Facility: HOSPITAL | Age: 53
End: 2023-09-07
Payer: COMMERCIAL

## 2023-09-07 ENCOUNTER — APPOINTMENT (OUTPATIENT)
Dept: GENERAL RADIOLOGY | Facility: HOSPITAL | Age: 53
End: 2023-09-07
Payer: COMMERCIAL

## 2023-09-07 VITALS
HEIGHT: 67 IN | RESPIRATION RATE: 18 BRPM | WEIGHT: 222 LBS | OXYGEN SATURATION: 98 % | SYSTOLIC BLOOD PRESSURE: 125 MMHG | TEMPERATURE: 98.2 F | DIASTOLIC BLOOD PRESSURE: 79 MMHG | HEART RATE: 92 BPM | BODY MASS INDEX: 34.84 KG/M2

## 2023-09-07 DIAGNOSIS — R94.8 ABNORMAL BILIARY HIDA SCAN: ICD-10-CM

## 2023-09-07 LAB
B-HCG UR QL: NEGATIVE
EXPIRATION DATE: NORMAL
INTERNAL NEGATIVE CONTROL: NEGATIVE
INTERNAL POSITIVE CONTROL: POSITIVE
Lab: NORMAL
QT INTERVAL: 412 MS
QTC INTERVAL: 435 MS

## 2023-09-07 PROCEDURE — 25010000002 KETOROLAC TROMETHAMINE PER 15 MG: Performed by: NURSE ANESTHETIST, CERTIFIED REGISTERED

## 2023-09-07 PROCEDURE — 81025 URINE PREGNANCY TEST: CPT | Performed by: ANESTHESIOLOGY

## 2023-09-07 PROCEDURE — 25010000002 MIDAZOLAM PER 1 MG: Performed by: NURSE ANESTHETIST, CERTIFIED REGISTERED

## 2023-09-07 PROCEDURE — 25010000002 ONDANSETRON PER 1 MG: Performed by: NURSE ANESTHETIST, CERTIFIED REGISTERED

## 2023-09-07 PROCEDURE — 94640 AIRWAY INHALATION TREATMENT: CPT

## 2023-09-07 PROCEDURE — 47562 LAPAROSCOPIC CHOLECYSTECTOMY: CPT | Performed by: SURGERY

## 2023-09-07 PROCEDURE — 25010000002 FENTANYL CITRATE (PF) 50 MCG/ML SOLUTION: Performed by: NURSE ANESTHETIST, CERTIFIED REGISTERED

## 2023-09-07 PROCEDURE — 25010000002 SUGAMMADEX 500 MG/5ML SOLUTION: Performed by: NURSE ANESTHETIST, CERTIFIED REGISTERED

## 2023-09-07 PROCEDURE — 25010000002 SUCCINYLCHOLINE PER 20 MG: Performed by: NURSE ANESTHETIST, CERTIFIED REGISTERED

## 2023-09-07 PROCEDURE — 25010000002 NEOSTIGMINE 10 MG/10ML SOLUTION: Performed by: NURSE ANESTHETIST, CERTIFIED REGISTERED

## 2023-09-07 PROCEDURE — 25010000002 PROPOFOL 200 MG/20ML EMULSION: Performed by: NURSE ANESTHETIST, CERTIFIED REGISTERED

## 2023-09-07 PROCEDURE — 94799 UNLISTED PULMONARY SVC/PX: CPT

## 2023-09-07 DEVICE — LIGACLIP 10-M/L, 10MM ENDOSCOPIC ROTATING MULTIPLE CLIP APPLIERS
Type: IMPLANTABLE DEVICE | Site: ABDOMEN | Status: FUNCTIONAL
Brand: LIGACLIP

## 2023-09-07 RX ORDER — NEOSTIGMINE METHYLSULFATE 1 MG/ML
INJECTION, SOLUTION INTRAVENOUS AS NEEDED
Status: DISCONTINUED | OUTPATIENT
Start: 2023-09-07 | End: 2023-09-07 | Stop reason: SURG

## 2023-09-07 RX ORDER — SODIUM CHLORIDE 0.9 % (FLUSH) 0.9 %
10 SYRINGE (ML) INJECTION EVERY 12 HOURS SCHEDULED
Status: DISCONTINUED | OUTPATIENT
Start: 2023-09-07 | End: 2023-09-07 | Stop reason: HOSPADM

## 2023-09-07 RX ORDER — SODIUM CHLORIDE, SODIUM LACTATE, POTASSIUM CHLORIDE, CALCIUM CHLORIDE 600; 310; 30; 20 MG/100ML; MG/100ML; MG/100ML; MG/100ML
INJECTION, SOLUTION INTRAVENOUS CONTINUOUS PRN
Status: DISCONTINUED | OUTPATIENT
Start: 2023-09-07 | End: 2023-09-07 | Stop reason: SURG

## 2023-09-07 RX ORDER — PROPOFOL 10 MG/ML
INJECTION, EMULSION INTRAVENOUS AS NEEDED
Status: DISCONTINUED | OUTPATIENT
Start: 2023-09-07 | End: 2023-09-07 | Stop reason: SURG

## 2023-09-07 RX ORDER — ONDANSETRON 2 MG/ML
INJECTION INTRAMUSCULAR; INTRAVENOUS AS NEEDED
Status: DISCONTINUED | OUTPATIENT
Start: 2023-09-07 | End: 2023-09-07 | Stop reason: SURG

## 2023-09-07 RX ORDER — VECURONIUM BROMIDE 1 MG/ML
INJECTION, POWDER, LYOPHILIZED, FOR SOLUTION INTRAVENOUS AS NEEDED
Status: DISCONTINUED | OUTPATIENT
Start: 2023-09-07 | End: 2023-09-07 | Stop reason: SURG

## 2023-09-07 RX ORDER — OXYCODONE HYDROCHLORIDE AND ACETAMINOPHEN 5; 325 MG/1; MG/1
1 TABLET ORAL ONCE AS NEEDED
Status: COMPLETED | OUTPATIENT
Start: 2023-09-07 | End: 2023-09-07

## 2023-09-07 RX ORDER — BUPIVACAINE HYDROCHLORIDE AND EPINEPHRINE 5; 5 MG/ML; UG/ML
INJECTION, SOLUTION PERINEURAL AS NEEDED
Status: DISCONTINUED | OUTPATIENT
Start: 2023-09-07 | End: 2023-09-07 | Stop reason: HOSPADM

## 2023-09-07 RX ORDER — MEPERIDINE HYDROCHLORIDE 25 MG/ML
12.5 INJECTION INTRAMUSCULAR; INTRAVENOUS; SUBCUTANEOUS
Status: DISCONTINUED | OUTPATIENT
Start: 2023-09-07 | End: 2023-09-07 | Stop reason: HOSPADM

## 2023-09-07 RX ORDER — MAGNESIUM HYDROXIDE 1200 MG/15ML
LIQUID ORAL AS NEEDED
Status: DISCONTINUED | OUTPATIENT
Start: 2023-09-07 | End: 2023-09-07 | Stop reason: HOSPADM

## 2023-09-07 RX ORDER — LIDOCAINE HYDROCHLORIDE 20 MG/ML
INJECTION, SOLUTION EPIDURAL; INFILTRATION; INTRACAUDAL; PERINEURAL AS NEEDED
Status: DISCONTINUED | OUTPATIENT
Start: 2023-09-07 | End: 2023-09-07 | Stop reason: SURG

## 2023-09-07 RX ORDER — SUCCINYLCHOLINE CHLORIDE 20 MG/ML
INJECTION INTRAMUSCULAR; INTRAVENOUS AS NEEDED
Status: DISCONTINUED | OUTPATIENT
Start: 2023-09-07 | End: 2023-09-07 | Stop reason: SURG

## 2023-09-07 RX ORDER — IPRATROPIUM BROMIDE AND ALBUTEROL SULFATE 2.5; .5 MG/3ML; MG/3ML
3 SOLUTION RESPIRATORY (INHALATION) ONCE
Status: COMPLETED | OUTPATIENT
Start: 2023-09-07 | End: 2023-09-07

## 2023-09-07 RX ORDER — IPRATROPIUM BROMIDE AND ALBUTEROL SULFATE 2.5; .5 MG/3ML; MG/3ML
3 SOLUTION RESPIRATORY (INHALATION) ONCE AS NEEDED
Status: DISCONTINUED | OUTPATIENT
Start: 2023-09-07 | End: 2023-09-07 | Stop reason: HOSPADM

## 2023-09-07 RX ORDER — FENTANYL CITRATE 50 UG/ML
50 INJECTION, SOLUTION INTRAMUSCULAR; INTRAVENOUS
Status: DISCONTINUED | OUTPATIENT
Start: 2023-09-07 | End: 2023-09-07 | Stop reason: HOSPADM

## 2023-09-07 RX ORDER — GLYCOPYRROLATE 0.2 MG/ML
INJECTION INTRAMUSCULAR; INTRAVENOUS AS NEEDED
Status: DISCONTINUED | OUTPATIENT
Start: 2023-09-07 | End: 2023-09-07 | Stop reason: SURG

## 2023-09-07 RX ORDER — MIDAZOLAM HYDROCHLORIDE 1 MG/ML
INJECTION INTRAMUSCULAR; INTRAVENOUS AS NEEDED
Status: DISCONTINUED | OUTPATIENT
Start: 2023-09-07 | End: 2023-09-07 | Stop reason: SURG

## 2023-09-07 RX ORDER — SODIUM CHLORIDE, SODIUM LACTATE, POTASSIUM CHLORIDE, CALCIUM CHLORIDE 600; 310; 30; 20 MG/100ML; MG/100ML; MG/100ML; MG/100ML
100 INJECTION, SOLUTION INTRAVENOUS ONCE AS NEEDED
Status: DISCONTINUED | OUTPATIENT
Start: 2023-09-07 | End: 2023-09-07 | Stop reason: HOSPADM

## 2023-09-07 RX ORDER — SODIUM CHLORIDE, SODIUM LACTATE, POTASSIUM CHLORIDE, CALCIUM CHLORIDE 600; 310; 30; 20 MG/100ML; MG/100ML; MG/100ML; MG/100ML
125 INJECTION, SOLUTION INTRAVENOUS ONCE
Status: COMPLETED | OUTPATIENT
Start: 2023-09-07 | End: 2023-09-07

## 2023-09-07 RX ORDER — FENTANYL CITRATE 50 UG/ML
INJECTION, SOLUTION INTRAMUSCULAR; INTRAVENOUS AS NEEDED
Status: DISCONTINUED | OUTPATIENT
Start: 2023-09-07 | End: 2023-09-07 | Stop reason: SURG

## 2023-09-07 RX ORDER — ONDANSETRON 2 MG/ML
4 INJECTION INTRAMUSCULAR; INTRAVENOUS AS NEEDED
Status: DISCONTINUED | OUTPATIENT
Start: 2023-09-07 | End: 2023-09-07 | Stop reason: HOSPADM

## 2023-09-07 RX ORDER — FAMOTIDINE 10 MG/ML
INJECTION, SOLUTION INTRAVENOUS AS NEEDED
Status: DISCONTINUED | OUTPATIENT
Start: 2023-09-07 | End: 2023-09-07 | Stop reason: SURG

## 2023-09-07 RX ORDER — SODIUM CHLORIDE 9 MG/ML
INJECTION, SOLUTION INTRAVENOUS AS NEEDED
Status: DISCONTINUED | OUTPATIENT
Start: 2023-09-07 | End: 2023-09-07 | Stop reason: HOSPADM

## 2023-09-07 RX ORDER — KETOROLAC TROMETHAMINE 30 MG/ML
INJECTION, SOLUTION INTRAMUSCULAR; INTRAVENOUS AS NEEDED
Status: DISCONTINUED | OUTPATIENT
Start: 2023-09-07 | End: 2023-09-07 | Stop reason: SURG

## 2023-09-07 RX ORDER — SODIUM CHLORIDE 0.9 % (FLUSH) 0.9 %
10 SYRINGE (ML) INJECTION AS NEEDED
Status: DISCONTINUED | OUTPATIENT
Start: 2023-09-07 | End: 2023-09-07 | Stop reason: HOSPADM

## 2023-09-07 RX ORDER — MIDAZOLAM HYDROCHLORIDE 1 MG/ML
1 INJECTION INTRAMUSCULAR; INTRAVENOUS
Status: DISCONTINUED | OUTPATIENT
Start: 2023-09-07 | End: 2023-09-07 | Stop reason: HOSPADM

## 2023-09-07 RX ORDER — SODIUM CHLORIDE 9 MG/ML
40 INJECTION, SOLUTION INTRAVENOUS AS NEEDED
Status: DISCONTINUED | OUTPATIENT
Start: 2023-09-07 | End: 2023-09-07 | Stop reason: HOSPADM

## 2023-09-07 RX ORDER — HYDROCODONE BITARTRATE AND ACETAMINOPHEN 5; 325 MG/1; MG/1
TABLET ORAL
Qty: 10 TABLET | Refills: 0 | Status: SHIPPED | OUTPATIENT
Start: 2023-09-07

## 2023-09-07 RX ADMIN — VECURONIUM BROMIDE 2 MG: 1 INJECTION, POWDER, LYOPHILIZED, FOR SOLUTION INTRAVENOUS at 08:58

## 2023-09-07 RX ADMIN — FENTANYL CITRATE 100 MCG: 50 INJECTION INTRAMUSCULAR; INTRAVENOUS at 08:50

## 2023-09-07 RX ADMIN — GLYCOPYRROLATE 0.4 MG: 0.4 INJECTION INTRAMUSCULAR; INTRAVENOUS at 09:04

## 2023-09-07 RX ADMIN — KETOROLAC TROMETHAMINE 30 MG: 30 INJECTION, SOLUTION INTRAMUSCULAR; INTRAVENOUS at 08:56

## 2023-09-07 RX ADMIN — MIDAZOLAM HYDROCHLORIDE 2 MG: 1 INJECTION, SOLUTION INTRAMUSCULAR; INTRAVENOUS at 09:12

## 2023-09-07 RX ADMIN — OXYCODONE AND ACETAMINOPHEN 1 TABLET: 5; 325 TABLET ORAL at 09:56

## 2023-09-07 RX ADMIN — ONDANSETRON 4 MG: 2 INJECTION INTRAMUSCULAR; INTRAVENOUS at 08:44

## 2023-09-07 RX ADMIN — FAMOTIDINE 20 MG: 10 INJECTION, SOLUTION INTRAVENOUS at 08:44

## 2023-09-07 RX ADMIN — NEOSTIGMINE METHYLSULFATE 3 MG: 0.5 INJECTION INTRAVENOUS at 09:04

## 2023-09-07 RX ADMIN — LIDOCAINE HYDROCHLORIDE 60 MG: 20 INJECTION, SOLUTION EPIDURAL; INFILTRATION; INTRACAUDAL; PERINEURAL at 08:50

## 2023-09-07 RX ADMIN — SODIUM CHLORIDE, POTASSIUM CHLORIDE, SODIUM LACTATE AND CALCIUM CHLORIDE 125 ML/HR: 600; 310; 30; 20 INJECTION, SOLUTION INTRAVENOUS at 08:23

## 2023-09-07 RX ADMIN — SODIUM CHLORIDE, POTASSIUM CHLORIDE, SODIUM LACTATE AND CALCIUM CHLORIDE: 600; 310; 30; 20 INJECTION, SOLUTION INTRAVENOUS at 08:44

## 2023-09-07 RX ADMIN — SUGAMMADEX 2 MG: 100 INJECTION, SOLUTION INTRAVENOUS at 09:17

## 2023-09-07 RX ADMIN — SUCCINYLCHOLINE CHLORIDE 100 MG: 20 INJECTION, SOLUTION INTRAMUSCULAR; INTRAVENOUS at 08:50

## 2023-09-07 RX ADMIN — GLYCOPYRROLATE 0.2 MG: 0.4 INJECTION INTRAMUSCULAR; INTRAVENOUS at 08:56

## 2023-09-07 RX ADMIN — IPRATROPIUM BROMIDE AND ALBUTEROL SULFATE 3 ML: 2.5; .5 SOLUTION RESPIRATORY (INHALATION) at 08:23

## 2023-09-07 RX ADMIN — PROPOFOL 150 MG: 10 INJECTION, EMULSION INTRAVENOUS at 08:50

## 2023-09-07 NOTE — OP NOTE
CHOLECYSTECTOMY LAPAROSCOPIC  Procedure Note    Debbie Lanier  9/7/2023    Pre-op Diagnosis:   Abnormal biliary HIDA scan [R94.8]    Post-op Diagnosis: same        Procedure(s):  CHOLECYSTECTOMY LAPAROSCOPIC    Surgeon(s):  Jose M Barreto MD    Anesthesia: General    Staff:   Circulator: Elicia Sotomayor RN  Scrub Person: Jhonatan Rao Kathy    Estimated Blood Loss: minimal    Specimens:                Order Name Source Comment Collection Info Order Time   TISSUE EXAM, [facility] ([facility]) Gallbladder  Collected By: Jose M Barreto MD 9/7/2023  9:01 AM     Release to patient   Routine Release              Drains: * No LDAs found *    Procedure: The abdomen was prepped and draped. Two 5 mm and one 11 mm port placed. The cystic duct and artery were carefully dissected out, clearly identified, clipped, and divided. The gallbladder was taken off the liver with cautery and brought out the upper midline port. The area was irrigated and suctioned. The gas was allowed to escape and the ports removed. They were closed with vicryl.     Findings:             Complications: none   Grafts / Implants N/A    Jose M Barreto MD     Date: 9/7/2023  Time: 09:13 EDT

## 2023-09-07 NOTE — ANESTHESIA POSTPROCEDURE EVALUATION
Patient: Debbie Lanier    Procedure Summary       Date: 09/07/23 Room / Location: [facility] OR  /  [facility] OR    Anesthesia Start: 0844 Anesthesia Stop: 0914    Procedure: CHOLECYSTECTOMY LAPAROSCOPIC (Abdomen) Diagnosis:       Abnormal biliary HIDA scan      (Abnormal biliary HIDA scan [R94.8])    Surgeons: Jose M Barreto MD Provider: Carlos Bejarano MD    Anesthesia Type: general ASA Status: 2            Anesthesia Type: general    Vitals  Vitals Value Taken Time   /85 09/07/23 0945   Temp 98 °F (36.7 °C) 09/07/23 0915   Pulse 80 09/07/23 0945   Resp 18 09/07/23 0945   SpO2 95 % 09/07/23 0945           Post Anesthesia Care and Evaluation    Patient location during evaluation: PACU  Patient participation: complete - patient participated  Level of consciousness: awake  Pain score: 0  Pain management: adequate    Airway patency: patent  Anesthetic complications: No anesthetic complications  PONV Status: none  Cardiovascular status: hemodynamically stable  Respiratory status: nasal cannula  Hydration status: acceptable

## 2023-09-07 NOTE — ANESTHESIA PREPROCEDURE EVALUATION
Anesthesia Evaluation     Patient summary reviewed and Nursing notes reviewed   no history of anesthetic complications:   NPO Solid Status: > 8 hours  NPO Liquid Status: > 8 hours           Airway   Mallampati: II  TM distance: >3 FB  Neck ROM: full  Dental - normal exam     Pulmonary - normal exam   (+) asthma,recent URI  Cardiovascular - normal exam  Exercise tolerance: good (4-7 METS)    ECG reviewed  Rhythm: regular  Rate: normal    (+) hypertension      Neuro/Psych- negative ROS  GI/Hepatic/Renal/Endo    (+) hiatal hernia, GERD    Musculoskeletal (-) negative ROS    Abdominal   (+) obese    Abdomen: soft.   Substance History - negative use     OB/GYN negative ob/gyn ROS         Other - negative ROS                     Anesthesia Plan    ASA 2     general     intravenous induction     Anesthetic plan, risks, benefits, and alternatives have been provided, discussed and informed consent has been obtained with: patient.  Pre-procedure education provided  Use of blood products discussed with  Consented to blood products.    Plan discussed with CRNA.    CODE STATUS:

## 2023-09-07 NOTE — ANESTHESIA PROCEDURE NOTES
Airway  Urgency: elective    Date/Time: 9/7/2023 8:51 AM  Airway not difficult    General Information and Staff    Patient location during procedure: OR  CRNA/CAA: Juliann Ramos CRNA    Indications and Patient Condition  Indications for airway management: airway protection    Preoxygenated: yes  MILS maintained throughout  Mask difficulty assessment: 0 - not attempted    Final Airway Details  Final airway type: endotracheal airway      Successful airway: ETT  Cuffed: yes   Successful intubation technique: direct laryngoscopy  Facilitating devices/methods: intubating stylet  Endotracheal tube insertion site: oral  Blade: Glidescope  Blade size: 3  ETT size (mm): 7.0  Placement verified by: chest auscultation   Number of attempts at approach: 1  Assessment: lips, teeth, and gum same as pre-op and atraumatic intubation

## 2023-09-11 LAB — REF LAB TEST METHOD: NORMAL

## 2023-09-14 ENCOUNTER — OFFICE VISIT (OUTPATIENT)
Dept: SURGERY | Facility: CLINIC | Age: 53
End: 2023-09-14
Payer: COMMERCIAL

## 2023-09-14 VITALS — HEIGHT: 67 IN | WEIGHT: 222 LBS | BODY MASS INDEX: 34.84 KG/M2

## 2023-09-14 DIAGNOSIS — Z90.49 STATUS POST LAPAROSCOPIC CHOLECYSTECTOMY: Primary | ICD-10-CM

## 2023-09-14 PROCEDURE — 99024 POSTOP FOLLOW-UP VISIT: CPT | Performed by: SURGERY

## 2023-09-14 NOTE — PROGRESS NOTES
Subjective   Debbie Lanier is a 53 y.o. female.     Chief Complaint: post lap jimmy    History of Present Illness She is about 1 week post lap jimmy and doing well.     The following portions of the patient's history were reviewed and updated as appropriate: current medications, past family history, past medical history, past social history, past surgical history and problem list.    Review of Systems    Objective   Physical Exam wounds ok    Past Medical History:   Diagnosis Date    Asthma     Cholelithiasis 07/23    7% function    Colon polyp 05/2019    Diverticulitis of colon 05/ 2020    Mild    Fibrocystic breast 08/2019    GERD (gastroesophageal reflux disease)     Hiatal hernia     Hypertension 11/2018    Take low does of medicine    MVP (mitral valve prolapse)        Family History   Problem Relation Age of Onset    Thyroid disease Mother     Diabetes Mother         Watches diet    Depression Mother     Breast cancer Neg Hx        Social History     Tobacco Use    Smoking status: Never    Smokeless tobacco: Never   Vaping Use    Vaping Use: Never used   Substance Use Topics    Alcohol use: No    Drug use: No       Past Surgical History:   Procedure Laterality Date    CHOLECYSTECTOMY N/A 9/7/2023    Procedure: CHOLECYSTECTOMY LAPAROSCOPIC;  Surgeon: Jose M Barreto MD;  Location: [facility];  Service: General;  Laterality: N/A;    D & C AND LAPAROSCOPY      ENDOSCOPY      HEMORRHOIDECTOMY  5/2005    TONSILLECTOMY      TUBAL ABDOMINAL LIGATION         Current Outpatient Medications   Medication Instructions    azelastine (ASTELIN) 0.1 % nasal spray 2 sprays, Nasal, 2 Times Daily, Use in each nostril as directed    Budeson-Glycopyrrol-Formoterol (Breztri Aerosphere) 160-9-4.8 MCG/ACT aerosol inhaler 2 puffs, Inhalation, 2 Times Daily    Dupilumab (Dupixent) 300 MG/2ML solution pen-injector Subcutaneous    escitalopram (LEXAPRO) 20 mg, Oral, Daily    estradiol-norethindrone (COMBIPATCH) 0.05-0.14 MG/DAY patch  1 patch, Transdermal, 2 Times Weekly    fexofenadine (ALLEGRA) 180 mg, Oral, Daily    fluticasone-salmeterol (ADVAIR) 100-50 MCG/DOSE DISKUS Inhalation, 2 Times Daily - RT    HYDROcodone-acetaminophen (NORCO) 5-325 MG per tablet Take 1 tablet by mouth every 4 hours as needed for pain.    ibuprofen (ADVIL,MOTRIN) 800 mg, Oral, 3 Times Daily PRN    losartan (COZAAR) 25 mg, Oral, Daily    montelukast (SINGULAIR) 10 mg, Oral, Nightly    orphenadrine (NORFLEX) 100 mg, Oral, 2 Times Daily    pantoprazole (PROTONIX) 20 mg, Oral, Daily    triamcinolone (KENALOG) 0.025 % cream 1 application , Topical, 2 Times Daily    Vitamin D (Cholecalciferol) (CHOLECALCIFEROL) 400 Units, Oral, Daily    ZOLMitriptan (ZOMIG) 5 MG nasal solution Nasal, As Needed         Assessment & Plan   Diagnoses and all orders for this visit:    1. Status post laparoscopic cholecystectomy (Primary)    Return prn

## 2024-01-05 ENCOUNTER — TRANSCRIBE ORDERS (OUTPATIENT)
Dept: ADMINISTRATIVE | Facility: HOSPITAL | Age: 54
End: 2024-01-05
Payer: COMMERCIAL

## 2024-01-05 DIAGNOSIS — Z12.31 VISIT FOR SCREENING MAMMOGRAM: Primary | ICD-10-CM

## 2024-01-24 ENCOUNTER — HOSPITAL ENCOUNTER (OUTPATIENT)
Dept: MAMMOGRAPHY | Facility: HOSPITAL | Age: 54
Discharge: HOME | End: 2024-01-24
Admitting: FAMILY MEDICINE
Payer: COMMERCIAL

## 2024-01-24 DIAGNOSIS — Z12.31 VISIT FOR SCREENING MAMMOGRAM: ICD-10-CM

## 2024-01-24 PROCEDURE — 77067 SCR MAMMO BI INCL CAD: CPT | Performed by: RADIOLOGY

## 2024-01-24 PROCEDURE — 77067 SCR MAMMO BI INCL CAD: CPT

## 2024-01-24 PROCEDURE — 77063 BREAST TOMOSYNTHESIS BI: CPT | Performed by: RADIOLOGY

## 2024-01-24 PROCEDURE — 77063 BREAST TOMOSYNTHESIS BI: CPT

## 2024-04-09 ENCOUNTER — LAB (OUTPATIENT)
Dept: LAB | Facility: HOSPITAL | Age: 54
End: 2024-04-09
Payer: COMMERCIAL

## 2024-04-09 ENCOUNTER — TRANSCRIBE ORDERS (OUTPATIENT)
Dept: ADMINISTRATIVE | Facility: HOSPITAL | Age: 54
End: 2024-04-09
Payer: COMMERCIAL

## 2024-04-09 DIAGNOSIS — R06.02 SHORTNESS OF BREATH: Primary | ICD-10-CM

## 2024-04-09 DIAGNOSIS — R06.02 SHORTNESS OF BREATH: ICD-10-CM

## 2024-04-09 LAB
BASOPHILS # BLD AUTO: 0.05 10*3/MM3 (ref 0–0.2)
BASOPHILS NFR BLD AUTO: 0.6 % (ref 0–1.5)
D DIMER PPP FEU-MCNC: 0.42 MCGFEU/ML (ref 0–0.52)
DEPRECATED RDW RBC AUTO: 45.1 FL (ref 37–54)
EOSINOPHIL # BLD AUTO: 0.34 10*3/MM3 (ref 0–0.4)
EOSINOPHIL NFR BLD AUTO: 4.3 % (ref 0.3–6.2)
ERYTHROCYTE [DISTWIDTH] IN BLOOD BY AUTOMATED COUNT: 14.9 % (ref 12.3–15.4)
HCT VFR BLD AUTO: 42.8 % (ref 34–46.6)
HGB BLD-MCNC: 13.5 G/DL (ref 12–15.9)
IMM GRANULOCYTES # BLD AUTO: 0.02 10*3/MM3 (ref 0–0.05)
IMM GRANULOCYTES NFR BLD AUTO: 0.3 % (ref 0–0.5)
LYMPHOCYTES # BLD AUTO: 2.1 10*3/MM3 (ref 0.7–3.1)
LYMPHOCYTES NFR BLD AUTO: 26.8 % (ref 19.6–45.3)
MCH RBC QN AUTO: 26 PG (ref 26.6–33)
MCHC RBC AUTO-ENTMCNC: 31.5 G/DL (ref 31.5–35.7)
MCV RBC AUTO: 82.5 FL (ref 79–97)
MONOCYTES # BLD AUTO: 0.8 10*3/MM3 (ref 0.1–0.9)
MONOCYTES NFR BLD AUTO: 10.2 % (ref 5–12)
NEUTROPHILS NFR BLD AUTO: 4.54 10*3/MM3 (ref 1.7–7)
NEUTROPHILS NFR BLD AUTO: 57.8 % (ref 42.7–76)
NRBC BLD AUTO-RTO: 0 /100 WBC (ref 0–0.2)
PLATELET # BLD AUTO: 324 10*3/MM3 (ref 140–450)
PMV BLD AUTO: 10.2 FL (ref 6–12)
RBC # BLD AUTO: 5.19 10*6/MM3 (ref 3.77–5.28)
WBC NRBC COR # BLD AUTO: 7.85 10*3/MM3 (ref 3.4–10.8)

## 2024-04-09 PROCEDURE — 85379 FIBRIN DEGRADATION QUANT: CPT

## 2024-04-09 PROCEDURE — 85025 COMPLETE CBC W/AUTO DIFF WBC: CPT

## 2024-04-09 PROCEDURE — 36415 COLL VENOUS BLD VENIPUNCTURE: CPT

## 2024-04-09 PROCEDURE — 82785 ASSAY OF IGE: CPT

## 2024-04-16 LAB — IGE SERPL-ACNC: 11 IU/ML (ref 6–495)

## 2024-10-17 NOTE — PROGRESS NOTES
"Subjective   Debbie Lanier is a 54 y.o. female who presents today for initial evaluation     Chief Complaint:  Anxiety    History of Present Illness  This is a new patient, here today for evaluation  Patient is    Previous marriages: 4  Children: 2 children:  31 year old, female and 26 year old, female  Currently living with spouse, x 28  years to school  Education: High school diploma  Employment: Working, works for [employer], x 11 years, works from home.     Here today with complaints of Depression and Anxiety  Previous psychiatric diagnosis  None  Symptoms began approximately 7 years, has worsened since that time.  Previous psychiatric hospitalizations: No  Previous self harm behaviors: No  Risky behaviors Excessive spending, has had to file for bankruptcy twice.  Prior abuse: None  Previous psychiatric medications include lexapro, seroquel XR 50 mg, Buspar, Wellbutrin  Antidepressants: Lexapro  Antianxiety: Buspirone  Antipsychotics: Quetiapine  Mood stabilizers: None  ADHD: None    Family history of bipolar disorder: reports mother  Family history of schizophrenia disorder: denies  Family history of psychiatric disorders: reports mother and brother.  Depression rated 8/10, with 10 being the worst.  Anxiety rated 10/10, with 10 being the worst.    Sleep is poor, getting about 7 hours a night,  Nightmares: Denies, states she doesn't feel rested, feels like mind is going all of the time.  Appetite is good.  Hallucinations: Patient has visual hallucinations  Self-harm: Patient has passive thoughts of self harm, but denies plan or intent to harm themself. She feels hopeless at times and feels fatigued.   Alcohol use: no  Drug use: no  Marijuana use: no     Chronic health issues, no acute physical or medical issues today.    Details: She states she feels overwhelmed, sometimes lashes out at people. She states she has been blaming what she has been feeling on \"change of life\", but things have worsened.  She " "used to be \"outgoing\", but now she makes excuses not to do things, she isn't as active socially as she was in the past. She has had panic attacks as well.. She states her brother has some mental issues, but not sure. She states she didn't do well in school, when mother and stepfather were fighting a lot, her and brother stayed up at night, trying to prevent them from harming themselves. She has restless leg syndrome, has been prescribed pramipexole 0.5 mg tablet, she was titrated to 1 tablet, but she stopped taking it, because she started \"hallucinating\" that someone was at the foot of her bed. She stopped taking it Saturday. She states she hasn't had any further hallucinations since stopping medication.  She also states her legs aren't hurting any more. PCP is Naren Ivy, include asthma, HTN, migraines. She is currently taking Lexapro 20 mg (been on 6  to 7 years) daily, Seroquel XR 50 mg at night (recently started).  She states she \"would never\" harm herself. Voices passive thoughts of self harm when she feels helpless or hopeless. Again, she adamantly denies intent, plan to harm herself. She states she would never hurt herself, she loves her children and grandchildren. She States she would 'never\" harm herself. She reports she is very close to her spouse, children and grandchildren. She is not close to her mother or siblings.       The following portions of the patient's history were reviewed and updated as appropriate: allergies, current medications, past family history, past medical history, past social history, past surgical history and problem list.      Past Medical History:  Past Medical History:   Diagnosis Date    Anxiety     Asthma     Cholelithiasis 07/23    7% function    Colon polyp 05/2019    Depression     Disease of thyroid gland 1998    Hypothyroidism    Diverticulitis of colon 05/ 2020    Mild    Fibrocystic breast 08/2019    GERD (gastroesophageal reflux disease)     Hiatal hernia     " Hypertension 11/2018    Take low does of medicine    MVP (mitral valve prolapse)     Panic disorder        Social History:  Social History     Socioeconomic History    Marital status:    Tobacco Use    Smoking status: Never    Smokeless tobacco: Never   Vaping Use    Vaping status: Never Used   Substance and Sexual Activity    Alcohol use: No    Drug use: No    Sexual activity: Not Currently     Partners: Male     Birth control/protection: Tubal ligation       Family History:  Family History   Problem Relation Age of Onset    Thyroid disease Mother     Diabetes Mother         Watches diet    Depression Mother     Anxiety disorder Mother     Bipolar disorder Mother     Dementia Mother     Suicide Attempts Brother     Breast cancer Neg Hx        Past Surgical History:  Past Surgical History:   Procedure Laterality Date    ABDOMINAL SURGERY  9/23    Gallbladder surgery    CHOLECYSTECTOMY N/A 09/07/2023    Procedure: CHOLECYSTECTOMY LAPAROSCOPIC;  Surgeon: Jose M Barreto MD;  Location: [facility];  Service: General;  Laterality: N/A;    COLONOSCOPY  2023    D & C AND LAPAROSCOPY      ENDOSCOPY      HEMORRHOIDECTOMY  5/2005    TONSILLECTOMY      TUBAL ABDOMINAL LIGATION         Problem List:  Patient Active Problem List   Diagnosis    URI (upper respiratory infection)    Status post laparoscopic cholecystectomy       Allergy:   Allergies   Allergen Reactions    Indocin [Indomethacin] Itching    Tramadol Itching        Current Medications:   Current Outpatient Medications   Medication Sig Dispense Refill    azelastine (ASTELIN) 0.1 % nasal spray Administer 2 sprays into the nostril(s) as directed by provider 2 (Two) Times a Day. Use in each nostril as directed      Benralizumab (Fasenra) 30 MG/ML solution prefilled syringe Inject  under the skin into the appropriate area as directed.      Budeson-Glycopyrrol-Formoterol (Breztri Aerosphere) 160-9-4.8 MCG/ACT aerosol inhaler Inhale 2 puffs 2 (Two) Times a Day.       carvedilol (COREG) 6.25 MG tablet Take 1 tablet by mouth 2 (Two) Times a Day With Meals.      escitalopram (LEXAPRO) 10 MG tablet Take 1 tablet by mouth Daily. 7 tablet 0    fexofenadine (ALLEGRA) 180 MG tablet Take 1 tablet by mouth Daily.      ibuprofen (ADVIL,MOTRIN) 800 MG tablet Take 1 tablet by mouth 3 (Three) Times a Day As Needed for Mild Pain , Moderate Pain  or Fever. 30 tablet 0    losartan (COZAAR) 25 MG tablet Take 1 tablet by mouth Daily.      montelukast (SINGULAIR) 10 MG tablet Take 1 tablet by mouth Every Night.      pantoprazole (PROTONIX) 20 MG EC tablet Take 1 tablet by mouth Daily.      QUEtiapine fumarate ER (SEROquel XR) 50 MG tablet sustained-release 24 hour tablet Take 1 tablet by mouth every night at bedtime.      Rimegepant Sulfate (Nurtec) 75 MG tablet dispersible tablet Take  by mouth.      triamcinolone (KENALOG) 0.025 % cream Apply 1 Application topically to the appropriate area as directed 2 (Two) Times a Day.      Vitamin D, Cholecalciferol, (CHOLECALCIFEROL) 10 MCG (400 UNIT) tablet Take 1 tablet by mouth Daily.      ZOLMitriptan (ZOMIG) 5 MG nasal solution into the nostril(s) as directed by provider As Needed for Migraine.      Dupilumab (Dupixent) 300 MG/2ML solution pen-injector Inject  under the skin into the appropriate area as directed. (Patient not taking: Reported on 10/23/2024)      estradiol-norethindrone (COMBIPATCH) 0.05-0.14 MG/DAY patch Place 1 patch on the skin as directed by provider 2 (Two) Times a Week. (Patient not taking: Reported on 10/23/2024)      FLUoxetine (PROzac) 10 MG capsule Take 1 capsule by mouth Daily. 30 capsule 0    fluticasone-salmeterol (ADVAIR) 100-50 MCG/DOSE DISKUS Inhale 2 (two) times a day. (Patient not taking: Reported on 10/23/2024)      HYDROcodone-acetaminophen (NORCO) 5-325 MG per tablet Take 1 tablet by mouth every 4 hours as needed for pain. (Patient not taking: Reported on 10/23/2024) 10 tablet 0    orphenadrine (NORFLEX) 100 MG 12 hr  "tablet Take 1 tablet by mouth 2 (Two) Times a Day. (Patient not taking: Reported on 10/23/2024)       No current facility-administered medications for this visit.       Review of Symptoms:    Review of Systems   Psychiatric/Behavioral:  Positive for dysphoric mood, sleep disturbance, depressed mood and stress. Negative for hallucinations, self-injury and suicidal ideas. The patient is nervous/anxious.    All other systems reviewed and are negative.      Objective   Physical Exam:   Blood pressure 132/80, pulse 99, height 170.2 cm (67.01\"), weight 111 kg (244 lb 9.6 oz), SpO2 98%.  Body mass index is 38.3 kg/m².  Facility age limit for growth %naya is 20 years.    10/23/24    MENTAL STATUS EXAM   General Appearance:  Cleanly groomed and dressed  Eye Contact:  Good eye contact  Attitude:  Cooperative  Motor Activity:  Normal gait, posture  Speech:  Soft spoken  Language:  Spontaneous  Mood and affect:  Anxious and depressed  Hopelessness:  Denies  Thought Process:  Goal-directed  Associations/ Thought Content:  No delusions  Hallucinations:  None  Suicidal Ideations:  Not present and other  Other Comment:  Denies plan or intent to harm herself.  Homicidal Ideation:  Not present  Sensorium:  Alert  Orientation:  Person, place, time and situation  Insight:  Fair  Judgement:  Fair  Reliability:  Fair  Impulse Control:  Fair      PHQ-Score Total:  PHQ-9 Total Score: 16    Lab Results:   Office Visit on 10/23/2024   Component Date Value Ref Range Status    Amphetamine Screen, Urine 10/23/2024 Negative  Negative Final    Preliminary results. Refer to send out confirmation from [facility] for final results.    Barbiturates Screen, Urine 10/23/2024 Negative  Negative Final    Buprenorphine, Screen, Urine 10/23/2024 Negative  Negative Final    Benzodiazepine Screen, Urine 10/23/2024 Negative  Negative Final    Cocaine Screen, Urine 10/23/2024 Negative  Negative Final    MDMA (ECSTASY) 10/23/2024 Negative  Negative Final    " Methamphetamine, Ur 10/23/2024 Negative  Negative Final    Morphine/Opiates Screen, Urine 10/23/2024 Negative  Negative Final    Methadone Screen, Urine 10/23/2024 Negative  Negative Final    Oxycodone Screen, Urine 10/23/2024 Negative  Negative Final    Phencyclidine (PCP), Urine 10/23/2024 Negative  Negative Final    THC, Screen, Urine 10/23/2024 Negative  Negative Final       Assessment & Plan   Problems Addressed this Visit    None  Visit Diagnoses       Severe episode of recurrent major depressive disorder, without psychotic features    -  Primary    Relevant Medications    QUEtiapine fumarate ER (SEROquel XR) 50 MG tablet sustained-release 24 hour tablet    FLUoxetine (PROzac) 10 MG capsule    escitalopram (LEXAPRO) 10 MG tablet    Medication management        Relevant Orders    POC Medline 12 Panel Urine Drug Screen (Completed)    Generalized anxiety disorder        Relevant Medications    QUEtiapine fumarate ER (SEROquel XR) 50 MG tablet sustained-release 24 hour tablet    FLUoxetine (PROzac) 10 MG capsule    escitalopram (LEXAPRO) 10 MG tablet          Diagnoses         Codes Comments    Severe episode of recurrent major depressive disorder, without psychotic features    -  Primary ICD-10-CM: F33.2  ICD-9-CM: 296.33     Medication management     ICD-10-CM: Z79.899  ICD-9-CM: V58.69     Generalized anxiety disorder     ICD-10-CM: F41.1  ICD-9-CM: 300.02             Social History     Tobacco Use   Smoking Status Never   Smokeless Tobacco Never       JOANNE reviewed and appropriate. Patient counseled on use of controlled substances.     -The benefits of a healthy diet and exercise were discussed with patient, especially the positive effects they have on mental health. Patient encouraged to consider lifestyle modification regarding  diet and exercise patterns to maximize results of mental health treatment.  -Reviewed previous available documentation  -Reviewed most recent available labs     -I've explained to  her that drugs of the SSRI class can have side effects such as weight gain, sexual dysfunction, insomnia, headache, nausea. These medications are generally effective at alleviating symptoms of anxiety and/or depression. Let me know if significant side effects do occur.    -Previous psychiatric medications include lexapro, seroquel XR 50 mg, Buspar, Wellbutrin    -SUICIDE RISK ASSESSMENT: Unalterable demographics and a history of mental health intervention indicate this patient is in a high risk category compared to the general population. At present, the patient denies active SI/HI, intentions, or plans at this time and agrees to seek immediate care should such thoughts develop. The patient verbalizes understanding of how to access emergency care if needed and agrees to do so. Consideration of suicide risk and protective factors such as history, current presentation, individual strengths and weaknesses, psychosocial and environmental stressors and variables, psychiatric illness and symptoms, medical conditions and pain, took place in this interview. Based on those considerations, the patient is determined: within individual baseline and presenting no imminent risk for suicide or homicide. Other recommendations: The patient does not meet the criteria for inpatient admission and is not a safety risk to self or others at today's visit. Inpatient treatment offers no significant advantages over outpatient treatment for this patient at today's visit.      SAFETY PLAN:  Patient was given ample time for questions and fully participated in treatment planning.  Patient was encouraged to call the clinic with any questions or concerns.  Patient was informed of access to emergency care. If patient were to develop any significant symptomatology, suicidal ideation, homicidal ideation, any concerns, or feel unsafe at any time they are to call the clinic and if unable to get immediate assistance should immediately call 911 or go to  the nearest emergency room.  The patient is advised to remove or secure (lock away) all lethal weapons (including guns) and sharps (including razors, scissors, knives, etc.).  All medications (including any prescribed and any over the counter medications) should be stored in a safe and secured location that is not obtainable by children/adolescents.  Patient was given an opportunity and encouraged to ask questions about their medication, illness, and treatment. Patient contracted verbally for the following: If you are experiencing an emotional crisis or have thoughts of harming yourself or others, please go to your nearest local emergency room or call 911. Will continue to re-assess medication response and side effects frequently to establish efficacy and ensure safety. Risks, any black box warnings, side effects, off label usage, and benefits of medication and treatment discussed with patient, along with potential adverse side effects of current and/or newly prescribed medication, alternative treatment options, and OTC medications.  Patient verbalized understanding of potential risks, any off label use of medication, any black box warnings, and any side effects in their own words. The patient verbalized understanding and agreed to comply with the safety plan discussed in their own words.  Patient given the number to the office. Number also available to the 24- hour suicide hotline.      Visit Diagnoses:    ICD-10-CM ICD-9-CM   1. Severe episode of recurrent major depressive disorder, without psychotic features  F33.2 296.33   2. Medication management  Z79.899 V58.69   3. Generalized anxiety disorder  F41.1 300.02         TREATMENT PLAN/GOALS: Continue supportive psychotherapy efforts and medications as indicated. Treatment and medication options discussed during today's visit. Patient acknowledged and verbally consented to continue with current treatment plan and was educated on the importance of compliance with  treatment and follow-up appointments.    MEDICATION ISSUES:  Discussed medication options and treatment plan of prescribed medication as well as the risks, benefits, and side effects including potential falls, possible impaired driving and metabolic adversities among others. Patient is agreeable to call the office with any worsening of symptoms or onset of side effects. Patient is agreeable to call 911 or go to the nearest ER should he/she begin having SI/HI.     MEDS ORDERED DURING VISIT:  New Medications Ordered This Visit   Medications    FLUoxetine (PROzac) 10 MG capsule     Sig: Take 1 capsule by mouth Daily.     Dispense:  30 capsule     Refill:  0    escitalopram (LEXAPRO) 10 MG tablet     Sig: Take 1 tablet by mouth Daily.     Dispense:  7 tablet     Refill:  0     -Start fluoxetine 10 mg capsule, take 1 every night, , then increase to 20 mg capsule daily.     -Decrease Lexapro 10 mg tablet, take 1 tablet every day. X 7 days.      Return in about 1 month (around 11/23/2024).       Prognosis: Guarded dependent on medication/follow up and treatment plan compliance.  Functionality: pt showing improvements in important areas of daily functioning.     Short-term goals: Patient will adhere to medication regimen and note continued improvement in symptoms over the next 3 months.   Long-term goals: Patient will be adherent to medication management and psychotherapy with continued improvement in symptoms over the next 6 months.          This document has been electronically signed by NIYAH Mullen   October 25, 2024 10:25 EDT    Part of this note may be an electronic transcription/translation of spoken language to printed text using the Dragon Dictation System.

## 2024-10-23 ENCOUNTER — OFFICE VISIT (OUTPATIENT)
Dept: PSYCHIATRY | Facility: CLINIC | Age: 54
End: 2024-10-23
Payer: COMMERCIAL

## 2024-10-23 VITALS
OXYGEN SATURATION: 98 % | DIASTOLIC BLOOD PRESSURE: 80 MMHG | BODY MASS INDEX: 38.39 KG/M2 | SYSTOLIC BLOOD PRESSURE: 132 MMHG | HEIGHT: 67 IN | WEIGHT: 244.6 LBS | HEART RATE: 99 BPM

## 2024-10-23 DIAGNOSIS — F33.2 SEVERE EPISODE OF RECURRENT MAJOR DEPRESSIVE DISORDER, WITHOUT PSYCHOTIC FEATURES: Primary | ICD-10-CM

## 2024-10-23 DIAGNOSIS — F41.1 GENERALIZED ANXIETY DISORDER: ICD-10-CM

## 2024-10-23 DIAGNOSIS — Z79.899 MEDICATION MANAGEMENT: ICD-10-CM

## 2024-10-23 LAB
AMPHET+METHAMPHET UR QL: NEGATIVE
AMPHETAMINES UR QL: NEGATIVE
BARBITURATES UR QL SCN: NEGATIVE
BENZODIAZ UR QL SCN: NEGATIVE
BUPRENORPHINE SERPL-MCNC: NEGATIVE NG/ML
CANNABINOIDS SERPL QL: NEGATIVE
COCAINE UR QL: NEGATIVE
MDMA UR QL SCN: NEGATIVE
METHADONE UR QL SCN: NEGATIVE
MORPHINE/OPIATES SCREEN, URINE: NEGATIVE
OXYCODONE UR QL SCN: NEGATIVE
PCP UR QL SCN: NEGATIVE

## 2024-10-23 RX ORDER — QUETIAPINE FUMARATE 50 MG/1
50 TABLET, EXTENDED RELEASE ORAL
COMMUNITY
Start: 2024-10-07

## 2024-10-23 RX ORDER — BENRALIZUMAB 30 MG/ML
INJECTION, SOLUTION SUBCUTANEOUS
COMMUNITY

## 2024-10-23 RX ORDER — FLUOXETINE 10 MG/1
10 CAPSULE ORAL DAILY
Qty: 30 CAPSULE | Refills: 0 | Status: SHIPPED | OUTPATIENT
Start: 2024-10-23 | End: 2025-10-23

## 2024-10-23 RX ORDER — CARVEDILOL 6.25 MG/1
6.25 TABLET ORAL 2 TIMES DAILY WITH MEALS
COMMUNITY

## 2024-10-23 RX ORDER — RIMEGEPANT SULFATE 75 MG/75MG
TABLET, ORALLY DISINTEGRATING ORAL
COMMUNITY

## 2024-10-23 RX ORDER — ESCITALOPRAM OXALATE 10 MG/1
10 TABLET ORAL DAILY
Qty: 7 TABLET | Refills: 0 | Status: SHIPPED | OUTPATIENT
Start: 2024-10-23

## 2024-10-31 ENCOUNTER — OFFICE VISIT (OUTPATIENT)
Dept: PSYCHIATRY | Facility: CLINIC | Age: 54
End: 2024-10-31
Payer: COMMERCIAL

## 2024-10-31 DIAGNOSIS — F41.9 SEVERE ANXIETY: ICD-10-CM

## 2024-10-31 DIAGNOSIS — F33.2 SEVERE EPISODE OF RECURRENT MAJOR DEPRESSIVE DISORDER, WITHOUT PSYCHOTIC FEATURES: Primary | ICD-10-CM

## 2024-10-31 PROCEDURE — 90791 PSYCH DIAGNOSTIC EVALUATION: CPT | Performed by: COUNSELOR

## 2024-11-12 ENCOUNTER — OFFICE VISIT (OUTPATIENT)
Dept: PSYCHIATRY | Facility: CLINIC | Age: 54
End: 2024-11-12
Payer: COMMERCIAL

## 2024-11-12 DIAGNOSIS — Z86.59 HISTORY OF ANXIETY: ICD-10-CM

## 2024-11-12 DIAGNOSIS — F43.20 ADJUSTMENT DISORDER, UNSPECIFIED TYPE: Primary | ICD-10-CM

## 2024-11-12 PROCEDURE — 90837 PSYTX W PT 60 MINUTES: CPT | Performed by: COUNSELOR
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
## 2024-11-15 NOTE — PROGRESS NOTES
"Subjective   Debbie Lanier is a 54 y.o. female who presents today for follow up    Chief Complaint:  Anxiety    History of Present Illness  Today is a follow-up visit.     Medication compliance: patient is compliant with medications, denies SE.  Depression rated 5/10, with 10 being the worst.  Anxiety rated 5/10, with 10 being the worst.    Sleep is good, getting about 8 hours a night,  Nightmares: Denies  Appetite is good.  Hallucinations: Patient denies auditory hallucinations and visual hallucinations  Self-harm: Patient denies thoughts of self-harm.  Alcohol use: no  Drug use: no  Marijuana use: no     Chronic health issues, no acute physical or medical issues today.    Details: She states things are better. She is sleeping better. She continues to work from home in [employer]. She reports her spouse told her, her moods are better. She states she is getting back to \"being me\".           The following portions of the patient's history were reviewed and updated as appropriate: allergies, current medications, past family history, past medical history, past social history, past surgical history and problem list.      Past Medical History:  Past Medical History:   Diagnosis Date    Anxiety     Asthma     Cholelithiasis 07/23    7% function    Colon polyp 05/2019    Depression     Disease of thyroid gland 1998    Hypothyroidism    Diverticulitis of colon 05/ 2020    Mild    Fibrocystic breast 08/2019    GERD (gastroesophageal reflux disease)     Hiatal hernia     Hypertension 11/2018    Take low does of medicine    MVP (mitral valve prolapse)     Panic disorder        Social History:  Social History     Socioeconomic History    Marital status:    Tobacco Use    Smoking status: Never    Smokeless tobacco: Never   Vaping Use    Vaping status: Never Used   Substance and Sexual Activity    Alcohol use: No    Drug use: No    Sexual activity: Not Currently     Partners: Male     Birth control/protection: " Tubal ligation       Family History:  Family History   Problem Relation Age of Onset    Thyroid disease Mother     Diabetes Mother         Watches diet    Depression Mother     Anxiety disorder Mother     Bipolar disorder Mother     Dementia Mother     Suicide Attempts Brother     Breast cancer Neg Hx        Past Surgical History:  Past Surgical History:   Procedure Laterality Date    ABDOMINAL SURGERY  9/23    Gallbladder surgery    CHOLECYSTECTOMY N/A 09/07/2023    Procedure: CHOLECYSTECTOMY LAPAROSCOPIC;  Surgeon: Jose M Barreto MD;  [facility];  Service: General;  Laterality: N/A;    COLONOSCOPY  2023    D & C AND LAPAROSCOPY      ENDOSCOPY      HEMORRHOIDECTOMY  5/2005    TONSILLECTOMY      TUBAL ABDOMINAL LIGATION         Problem List:  Patient Active Problem List   Diagnosis    URI (upper respiratory infection)    Status post laparoscopic cholecystectomy       Allergy:   Allergies   Allergen Reactions    Indocin [Indomethacin] Itching    Tramadol Itching        Current Medications:   Current Outpatient Medications   Medication Sig Dispense Refill    azelastine (ASTELIN) 0.1 % nasal spray Administer 2 sprays into the nostril(s) as directed by provider 2 (Two) Times a Day. Use in each nostril as directed      Benralizumab (Fasenra) 30 MG/ML solution prefilled syringe Inject  under the skin into the appropriate area as directed.      Budeson-Glycopyrrol-Formoterol (Breztri Aerosphere) 160-9-4.8 MCG/ACT aerosol inhaler Inhale 2 puffs 2 (Two) Times a Day.      fexofenadine (ALLEGRA) 180 MG tablet Take 1 tablet by mouth Daily.      FLUoxetine (PROzac) 20 MG capsule Take 1 capsule by mouth Daily. 30 capsule 2    ibuprofen (ADVIL,MOTRIN) 800 MG tablet Take 1 tablet by mouth 3 (Three) Times a Day As Needed for Mild Pain , Moderate Pain  or Fever. 30 tablet 0    losartan (COZAAR) 25 MG tablet Take 1 tablet by mouth Daily.      montelukast (SINGULAIR) 10 MG tablet Take 1 tablet by mouth Every Night.       pantoprazole (PROTONIX) 20 MG EC tablet Take 1 tablet by mouth Daily.      Rimegepant Sulfate (Nurtec) 75 MG tablet dispersible tablet Take  by mouth.      triamcinolone (KENALOG) 0.025 % cream Apply 1 Application topically to the appropriate area as directed 2 (Two) Times a Day.      Vitamin D, Cholecalciferol, (CHOLECALCIFEROL) 10 MCG (400 UNIT) tablet Take 1 tablet by mouth Daily.      carvedilol (COREG) 6.25 MG tablet Take 1 tablet by mouth 2 (Two) Times a Day With Meals. (Patient not taking: Reported on 11/20/2024)      Dupilumab (Dupixent) 300 MG/2ML solution pen-injector Inject  under the skin into the appropriate area as directed. (Patient not taking: Reported on 11/20/2024)      estradiol-norethindrone (COMBIPATCH) 0.05-0.14 MG/DAY patch Place 1 patch on the skin as directed by provider 2 (Two) Times a Week. (Patient not taking: Reported on 11/20/2024)      fluticasone-salmeterol (ADVAIR) 100-50 MCG/DOSE DISKUS Inhale 2 (two) times a day. (Patient not taking: Reported on 11/20/2024)      HYDROcodone-acetaminophen (NORCO) 5-325 MG per tablet Take 1 tablet by mouth every 4 hours as needed for pain. (Patient not taking: Reported on 11/20/2024) 10 tablet 0    orphenadrine (NORFLEX) 100 MG 12 hr tablet Take 1 tablet by mouth 2 (Two) Times a Day. (Patient not taking: Reported on 11/20/2024)      QUEtiapine fumarate ER (SEROquel XR) 50 MG tablet sustained-release 24 hour tablet Take 1 tablet by mouth every night at bedtime. (Patient not taking: Reported on 11/20/2024)      ZOLMitriptan (ZOMIG) 5 MG nasal solution into the nostril(s) as directed by provider As Needed for Migraine. (Patient not taking: Reported on 11/20/2024)       No current facility-administered medications for this visit.       Review of Symptoms:    Review of Systems   Psychiatric/Behavioral:  Positive for depressed mood and stress. Negative for agitation, dysphoric mood, hallucinations, self-injury, sleep disturbance and suicidal ideas. The  "patient is nervous/anxious.    All other systems reviewed and are negative.      Objective   Physical Exam:   Blood pressure 122/80, pulse 69, height 170.2 cm (67.01\"), weight 115 kg (253 lb 3.2 oz), SpO2 95%.  Body mass index is 39.65 kg/m².  Facility age limit for growth %naya is 20 years.    11/20/24    MENTAL STATUS EXAM   General Appearance:  Cleanly groomed and dressed  Eye Contact:  Good eye contact  Attitude:  Cooperative  Motor Activity:  Normal gait, posture  Speech:  Normal rate, tone, volume  Language:  Spontaneous  Mood and affect:  Anxious  Hopelessness:  Denies  Thought Process:  Goal-directed  Associations/ Thought Content:  No delusions  Hallucinations:  None  Suicidal Ideations:  Not present  Homicidal Ideation:  Not present  Sensorium:  Alert  Orientation:  Person, place, time and situation  Insight:  Fair  Judgement:  Fair  Reliability:  Fair  Impulse Control:  Fair    PHQ-2 Total Score: 0    Lab Results:   Office Visit on 10/23/2024   Component Date Value Ref Range Status    Amphetamine Screen, Urine 10/23/2024 Negative  Negative Final    Preliminary results. Refer to send out confirmation from [facility] for final results.    Barbiturates Screen, Urine 10/23/2024 Negative  Negative Final    Buprenorphine, Screen, Urine 10/23/2024 Negative  Negative Final    Benzodiazepine Screen, Urine 10/23/2024 Negative  Negative Final    Cocaine Screen, Urine 10/23/2024 Negative  Negative Final    MDMA (ECSTASY) 10/23/2024 Negative  Negative Final    Methamphetamine, Ur 10/23/2024 Negative  Negative Final    Morphine/Opiates Screen, Urine 10/23/2024 Negative  Negative Final    Methadone Screen, Urine 10/23/2024 Negative  Negative Final    Oxycodone Screen, Urine 10/23/2024 Negative  Negative Final    Phencyclidine (PCP), Urine 10/23/2024 Negative  Negative Final    THC, Screen, Urine 10/23/2024 Negative  Negative Final       Assessment & Plan   Problems Addressed this Visit    None  Visit Diagnoses       " Severe episode of recurrent major depressive disorder, without psychotic features    -  Primary    Relevant Medications    FLUoxetine (PROzac) 20 MG capsule    Medication management        Relevant Medications    FLUoxetine (PROzac) 20 MG capsule    Generalized anxiety disorder        Relevant Medications    FLUoxetine (PROzac) 20 MG capsule          Diagnoses         Codes Comments    Severe episode of recurrent major depressive disorder, without psychotic features    -  Primary ICD-10-CM: F33.2  ICD-9-CM: 296.33     Medication management     ICD-10-CM: Z79.899  ICD-9-CM: V58.69     Generalized anxiety disorder     ICD-10-CM: F41.1  ICD-9-CM: 300.02             Social History     Tobacco Use   Smoking Status Never   Smokeless Tobacco Never       JOANNE reviewed and appropriate. Patient counseled on use of controlled substances.     -The benefits of a healthy diet and exercise were discussed with patient, especially the positive effects they have on mental health. Patient encouraged to consider lifestyle modification regarding  diet and exercise patterns to maximize results of mental health treatment.  -Reviewed previous available documentation  -Reviewed most recent available labs     -I've explained to her that drugs of the SSRI class can have side effects such as weight gain, sexual dysfunction, insomnia, headache, nausea. These medications are generally effective at alleviating symptoms of anxiety and/or depression. Let me know if significant side effects do occur.    -Previous psychiatric medications include lexapro, seroquel XR 50 mg, Buspar, Wellbutrin      Visit Diagnoses:    ICD-10-CM ICD-9-CM   1. Severe episode of recurrent major depressive disorder, without psychotic features  F33.2 296.33   2. Medication management  Z79.899 V58.69   3. Generalized anxiety disorder  F41.1 300.02       TREATMENT PLAN/GOALS: Continue supportive psychotherapy efforts and medications as indicated. Treatment and medication  options discussed during today's visit. Patient acknowledged and verbally consented to continue with current treatment plan and was educated on the importance of compliance with treatment and follow-up appointments.    MEDICATION ISSUES:  Discussed medication options and treatment plan of prescribed medication as well as the risks, benefits, and side effects including potential falls, possible impaired driving and metabolic adversities among others. Patient is agreeable to call the office with any worsening of symptoms or onset of side effects. Patient is agreeable to call 911 or go to the nearest ER should he/she begin having SI/HI.     MEDS ORDERED DURING VISIT:  New Medications Ordered This Visit   Medications    FLUoxetine (PROzac) 20 MG capsule     Sig: Take 1 capsule by mouth Daily.     Dispense:  30 capsule     Refill:  2     -Increase Fluoxetine 20 mg capsule, take 1 capsule daily.       Return in about 3 months (around 2/20/2025).       Prognosis: Guarded dependent on medication/follow up and treatment plan compliance.  Functionality: pt showing improvements in important areas of daily functioning.     Short-term goals: Patient will adhere to medication regimen and note continued improvement in symptoms over the next 3 months.   Long-term goals: Patient will be adherent to medication management and psychotherapy with continued improvement in symptoms over the next 6 months.    I spent 30 minutes caring for Debbie on this date of service. This time includes time spent by me in the following activities: preparing for the visit, performing a medically appropriate examination and/or evaluation, counseling and educating the patient/family/caregiver, documenting information in the medical record, and ordering medications          This document has been electronically signed by NIYAH Mullen   November 20, 2024 09:27 EST    Part of this note may be an electronic transcription/translation of spoken  language to printed text using the Dragon Dictation System.

## 2024-11-20 ENCOUNTER — OFFICE VISIT (OUTPATIENT)
Dept: PSYCHIATRY | Facility: CLINIC | Age: 54
End: 2024-11-20
Payer: COMMERCIAL

## 2024-11-20 ENCOUNTER — LAB (OUTPATIENT)
Dept: LAB | Facility: HOSPITAL | Age: 54
End: 2024-11-20
Payer: COMMERCIAL

## 2024-11-20 VITALS
DIASTOLIC BLOOD PRESSURE: 80 MMHG | HEART RATE: 69 BPM | BODY MASS INDEX: 39.74 KG/M2 | HEIGHT: 67 IN | OXYGEN SATURATION: 95 % | WEIGHT: 253.2 LBS | SYSTOLIC BLOOD PRESSURE: 122 MMHG

## 2024-11-20 DIAGNOSIS — F41.1 GENERALIZED ANXIETY DISORDER: ICD-10-CM

## 2024-11-20 DIAGNOSIS — F33.2 SEVERE EPISODE OF RECURRENT MAJOR DEPRESSIVE DISORDER, WITHOUT PSYCHOTIC FEATURES: Primary | ICD-10-CM

## 2024-11-20 DIAGNOSIS — Z79.899 MEDICATION MANAGEMENT: ICD-10-CM

## 2024-11-20 LAB
CHOLEST SERPL-MCNC: 200 MG/DL (ref 0–200)
HDLC SERPL-MCNC: 56 MG/DL (ref 40–60)
LDLC SERPL CALC-MCNC: 124 MG/DL (ref 0–100)
LDLC/HDLC SERPL: 2.18 {RATIO}
TRIGL SERPL-MCNC: 110 MG/DL (ref 0–150)
VLDLC SERPL-MCNC: 20 MG/DL (ref 5–40)

## 2024-11-20 PROCEDURE — 80061 LIPID PANEL: CPT | Performed by: NURSE PRACTITIONER

## 2024-11-25 ENCOUNTER — TELEPHONE (OUTPATIENT)
Dept: PSYCHIATRY | Facility: CLINIC | Age: 54
End: 2024-11-25
Payer: COMMERCIAL

## 2024-11-25 NOTE — TELEPHONE ENCOUNTER
Patient left voicemail stating she had an issue with the increase to medication.Tried returning her call to get more information.No answer left voicemail to return my call

## 2024-11-26 ENCOUNTER — OFFICE VISIT (OUTPATIENT)
Dept: PSYCHIATRY | Facility: CLINIC | Age: 54
End: 2024-11-26
Payer: COMMERCIAL

## 2024-11-26 DIAGNOSIS — F41.9 SEVERE ANXIETY: Primary | ICD-10-CM

## 2024-11-26 PROCEDURE — 90837 PSYTX W PT 60 MINUTES: CPT | Performed by: COUNSELOR

## 2024-12-11 NOTE — PROGRESS NOTES
"    PROGRESS NOTE  Data:  Debbie Lanier came in 2024 for her regularly scheduled therapy session starting at 2:30 PM and ending at 3:30 PM, with Melly Francis [facility].     (Scales based on 0 - 10 with 10 being the worst)  Depression: 0 Anxiety: 10     HPI:   Patient reported that her high anxiety has passed.  Earlier today she and her  were driving to their 2nd grader granddaughter's Grandparent's Day at her school.  \"Getting through traffic to arrive on time gave me chest pain.  My  tried to reassure me that we would still arrive on time.  I worried for nothing and were 10 minutes early.\"    \"My cousin's wife, age 26, just .  We were really close.\"    \"I learned that my baby daughter was in an accident.  I didn't know she is 7 weeks pregnant.  She called crying.  She has PCOS and feared having a miscarriage.  She was hemorrhaging.  Every day I'm worried.  We talk on SnapChat throughout the day.  I rushed to help her.\"    \"Our oldest called, she had taken the baby to urgent care.  She needs tubes in her ears.\"    \"I've been out of Prozac, the delivery is behind.  I've had headaches.  She increased another prescription.\"    Clinical Maneuvering/Intervention:  Assisted patient in processing above session content; acknowledged and normalized patient’s thoughts, feelings, and concerns.     Discussed the importance of comforting ourselves and allowing others to comfort us with the TRUTH, importance of recognizing when we are catastrophizing and correcting the message that causes high anxiety with the truth.    Allowed patient to freely discuss issues without interruption or judgment. Provided safe, confidential environment to facilitate the development of positive therapeutic relationship and encourage open, honest communication. Assisted patient in identifying risk factors which would indicate the need for higher level of care including thoughts to harm self or others and/or self-harming " behavior and encouraged patient to contact this office, call 911, or present to the nearest emergency room should any of these events occur. Discussed crisis intervention services and means to access.  Patient adamantly and convincingly denies current suicidal or homicidal ideation or perceptual disturbance.        Assessment     Patient is prone to catastrophize over situations that are not reasonably serious.    Diagnosis:   Encounter Diagnosis   Name Primary?    Severe anxiety Yes       Severe anxiety [F41.9]    Mental Status Exam  Hygiene:  good  Dress:  casual  Attitude:  Cooperative  Motor Activity:  Appropriate  Speech:  Normal  Mood:  within normal limits  Affect:  calm and pleasant  Thought Processes:  Goal directed and Linear  Thought Content:  normal  Suicidal Thoughts:  does not  Homicidal Thoughts:  does not  Crisis Safety Plan: yes, to come to the emergency room.  Hallucinations:  does not          Patient's Support Network Includes:  , children, mother, and friends    Progress toward goal: Not at goal    Functional Status: Mild impairment     Prognosis: Good with Ongoing Treatment       Plan         Patient will adhere to medication regimen as prescribed and report any side effects. Patient will contact this office, call 911 or present to the nearest emergency room should suicidal or homicidal ideations occur. Provide Cognitive Behavioral Therapy and Integrative Therapy to improve functioning, maintain stability, and avoid decompensation and the need for higher level of care.            Return in about 2 weeks (around 12/10/2024).            This document electronically signed by Melly Francis, BERTIN, NCC, CSAT  December 11, 2024 10:02 EST    Plan

## 2024-12-11 NOTE — PROGRESS NOTES
"    PROGRESS NOTE  Data:  Debbie Lanier came in 11/12/2024 for her regularly scheduled therapy session starting at 11:00 AM and ending at 12:00 PM, with Melly Francis [facility].     (Scales based on 0 - 10 with 10 being the worst)  Depression: 0 Anxiety: 0     HPI:   \"It's been going pretty good, I've been busy with work.\"    \"My baby daughter is looking for a vehicle so I sold her mine and I got a new one.  I'm getting used to my vehicle but I'm not sure if I like it.\"    \"My  got nervous driving in traffic and construction on our way here.\"    \"I've worked at home for 3-4  years since the beginning of Covid.  I have severe asthma.  I miss my coworkers, the venting, laughing, etc.  Now I work for the [employer] so I'm stuck at home but it pays well.\"    Patient rated her Love Tank at 80% full.  \"I need more connection with a Roman Catholic family.  We haven't gone since we moved to [facility].  We need to find a Roman Catholic.\"    \"I doubt myself and have low self-esteem.  I deal with memory and focus.  I'm worried about being transferred for a better fit.  Work asked us to give 2 choices of where we would like to work.\"    Clinical Maneuvering/Intervention:  Assisted patient in processing above session content; acknowledged and normalized patient’s thoughts, feelings, and concerns.     Introduced CBT with the metaphor of the cars on the train and the concepts of the Love Tank, 7 Desires of the Heart and Toxic Loneliness.  Provided handouts on these topics and and quote from Dr. Jose M Buchanan on self-esteem.    Allowed patient to freely discuss issues without interruption or judgment. Provided safe, confidential environment to facilitate the development of positive therapeutic relationship and encourage open, honest communication. Assisted patient in identifying risk factors which would indicate the need for higher level of care including thoughts to harm self or others and/or self-harming behavior and encouraged " patient to contact this office, call 911, or present to the nearest emergency room should any of these events occur. Discussed crisis intervention services and means to access.  Patient adamantly and convincingly denies current suicidal or homicidal ideation or perceptual disturbance.        Assessment     Patient is feeling emotionally stable today.    Diagnosis:   Encounter Diagnoses   Name Primary?    Adjustment disorder, unspecified type Yes    History of anxiety        Adjustment disorder, unspecified type [F43.20]    Mental Status Exam  Hygiene:  good  Dress:  casual  Attitude:  Cooperative  Motor Activity:  Appropriate  Speech:  Normal  Mood:  within normal limits  Affect:  calm and pleasant  Thought Processes:  Goal directed and Linear  Thought Content:  normal  Suicidal Thoughts:  does not  Homicidal Thoughts:  does not  Crisis Safety Plan: yes, to come to the emergency room.  Hallucinations:  does not          Patient's Support Network Includes:  , children, and friends    Progress toward goal: Not at goal    Functional Status: Mild impairment     Prognosis: Good with Ongoing Treatment       Plan         Patient will adhere to medication regimen as prescribed and report any side effects. Patient will contact this office, call 911 or present to the nearest emergency room should suicidal or homicidal ideations occur. Provide Cognitive Behavioral Therapy and Integrative Therapy to improve functioning, maintain stability, and avoid decompensation and the need for higher level of care.            Return in about 3 weeks (around 12/3/2024).            This document electronically signed by Melly Francis, BERTIN, NCC, CSAT  December 11, 2024 09:47 EST    Plan

## 2024-12-27 DIAGNOSIS — Z79.899 MEDICATION MANAGEMENT: ICD-10-CM

## 2024-12-27 DIAGNOSIS — F33.2 SEVERE EPISODE OF RECURRENT MAJOR DEPRESSIVE DISORDER, WITHOUT PSYCHOTIC FEATURES: ICD-10-CM

## 2024-12-27 DIAGNOSIS — F41.1 GENERALIZED ANXIETY DISORDER: ICD-10-CM

## 2024-12-27 RX ORDER — FLUOXETINE 10 MG/1
10 CAPSULE ORAL DAILY
Qty: 30 CAPSULE | Refills: 2 | Status: SHIPPED | OUTPATIENT
Start: 2024-12-27 | End: 2025-12-27

## 2024-12-27 NOTE — TELEPHONE ENCOUNTER
Dr Gold's patient called states she feels she was doing better on the 10 mg Prozac and not so good with the 20 mg.Requesting the 20 mg be canceled and the 10 mg sent in to fill. Can you please review this request

## 2025-01-22 ENCOUNTER — TRANSCRIBE ORDERS (OUTPATIENT)
Dept: ADMINISTRATIVE | Facility: HOSPITAL | Age: 55
End: 2025-01-22
Payer: COMMERCIAL

## 2025-01-22 DIAGNOSIS — Z12.31 VISIT FOR SCREENING MAMMOGRAM: Primary | ICD-10-CM

## 2025-01-27 ENCOUNTER — HOSPITAL ENCOUNTER (OUTPATIENT)
Dept: MAMMOGRAPHY | Facility: HOSPITAL | Age: 55
Discharge: HOME | End: 2025-01-27
Admitting: FAMILY MEDICINE
Payer: COMMERCIAL

## 2025-01-27 DIAGNOSIS — Z12.31 VISIT FOR SCREENING MAMMOGRAM: ICD-10-CM

## 2025-01-27 PROCEDURE — 77067 SCR MAMMO BI INCL CAD: CPT

## 2025-01-27 PROCEDURE — 77063 BREAST TOMOSYNTHESIS BI: CPT

## 2025-01-28 PROCEDURE — 77063 BREAST TOMOSYNTHESIS BI: CPT | Performed by: RADIOLOGY

## 2025-01-28 PROCEDURE — 77067 SCR MAMMO BI INCL CAD: CPT | Performed by: RADIOLOGY

## 2025-02-07 NOTE — PROGRESS NOTES
"Subjective   Debbie Lanier is a 54 y.o. female who presents today for follow up    Chief Complaint:  Anxiety    History of Present Illness  Today is a follow-up visit.   {tscschool option (Optional):78634}  Medication compliance: patient {tscmedicationcomp:42706}  Depression rated {numbers:53063}/10, with 10 being the worst.  Anxiety rated {numbers:46723}/10, with 10 being the worst.  {mood/adhd (Optional):23213}  Sleep is {good/fair/poor:797565814}, getting about {NUMBERS 0-24:17665} hours a night,  Nightmares: {nightmares:85664}  Appetite is {good/fair/poor:229933089}.  Hallucinations: Patient {tscselfharm:86988}  Self-harm: Patient {tscselfharm:88642}  Alcohol use: {YES:78577}  Drug use: {YES:84161}  Marijuana use: {YES:71692}     Chronic health issues, no acute physical or medical issues today.    Details:        Today is a follow-up visit.   {tscschool option (Optional):38313}  Medication compliance: patient is compliant with medications, denies SE.  Depression rated 5/10, with 10 being the worst.  Anxiety rated 5/10, with 10 being the worst.  {mood/adhd (Optional):61788}  Sleep is good, getting about 8 hours a night,  Nightmares: Denies  Appetite is good.  Hallucinations: Patient denies auditory hallucinations and visual hallucinations  Self-harm: Patient denies thoughts of self-harm.  Alcohol use: no  Drug use: no  Marijuana use: no     Chronic health issues, no acute physical or medical issues today.    Details: She states things are better. She is sleeping better. 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 She reports her spouse told her, her moods are better. She states she is getting back to \"being me\".           The following portions of the patient's history were reviewed and updated as appropriate: allergies, current medications, past family history, past medical history, past social history, past surgical history and problem list.      Past Medical History:  Past Medical History: "   Diagnosis Date    Anxiety     Asthma     Cholelithiasis 07/23    7% function    Colon polyp 05/2019    Depression     Disease of thyroid gland 1998    Hypothyroidism    Diverticulitis of colon 05/ 2020    Mild    Fibrocystic breast 08/2019    GERD (gastroesophageal reflux disease)     Hiatal hernia     Hypertension 11/2018    Take low does of medicine    MVP (mitral valve prolapse)     Panic disorder        Social History:  Social History     Socioeconomic History    Marital status:    Tobacco Use    Smoking status: Never    Smokeless tobacco: Never   Vaping Use    Vaping status: Never Used   Substance and Sexual Activity    Alcohol use: No    Drug use: No    Sexual activity: Not Currently     Partners: Male     Birth control/protection: Tubal ligation       Family History:  Family History   Problem Relation Age of Onset    Thyroid disease Mother     Diabetes Mother         Watches diet    Depression Mother     Anxiety disorder Mother     Bipolar disorder Mother     Dementia Mother     Suicide Attempts Brother     Breast cancer Neg Hx        Past Surgical History:  Past Surgical History:   Procedure Laterality Date    ABDOMINAL SURGERY  9/23    Gallbladder surgery    CHOLECYSTECTOMY N/A 09/07/2023    Procedure: CHOLECYSTECTOMY LAPAROSCOPIC;  Surgeon: Jose M Barreto MD;  [facility]: [facility];  Service: General;  Laterality: N/A;    COLONOSCOPY  2023    D & C AND LAPAROSCOPY      ENDOSCOPY      HEMORRHOIDECTOMY  5/2005    TONSILLECTOMY      TUBAL ABDOMINAL LIGATION         Problem List:  Patient Active Problem List   Diagnosis    URI (upper respiratory infection)    Status post laparoscopic cholecystectomy       Allergy:   Allergies   Allergen Reactions    Indocin [Indomethacin] Itching    Tramadol Itching        Current Medications:   Current Outpatient Medications   Medication Sig Dispense Refill    azelastine (ASTELIN) 0.1 % nasal spray Administer 2 sprays into the nostril(s) as directed by provider 2  (Two) Times a Day. Use in each nostril as directed      Benralizumab (Fasenra) 30 MG/ML solution prefilled syringe Inject  under the skin into the appropriate area as directed.      Budeson-Glycopyrrol-Formoterol (Breztri Aerosphere) 160-9-4.8 MCG/ACT aerosol inhaler Inhale 2 puffs 2 (Two) Times a Day.      carvedilol (COREG) 6.25 MG tablet Take 1 tablet by mouth 2 (Two) Times a Day With Meals. (Patient not taking: Reported on 11/20/2024)      Dupilumab (Dupixent) 300 MG/2ML solution pen-injector Inject  under the skin into the appropriate area as directed. (Patient not taking: Reported on 11/20/2024)      estradiol-norethindrone (COMBIPATCH) 0.05-0.14 MG/DAY patch Place 1 patch on the skin as directed by provider 2 (Two) Times a Week. (Patient not taking: Reported on 11/20/2024)      fexofenadine (ALLEGRA) 180 MG tablet Take 1 tablet by mouth Daily.      FLUoxetine (PROzac) 10 MG capsule Take 1 capsule by mouth Daily. 30 capsule 2    fluticasone-salmeterol (ADVAIR) 100-50 MCG/DOSE DISKUS Inhale 2 (two) times a day. (Patient not taking: Reported on 11/20/2024)      HYDROcodone-acetaminophen (NORCO) 5-325 MG per tablet Take 1 tablet by mouth every 4 hours as needed for pain. (Patient not taking: Reported on 11/20/2024) 10 tablet 0    ibuprofen (ADVIL,MOTRIN) 800 MG tablet Take 1 tablet by mouth 3 (Three) Times a Day As Needed for Mild Pain , Moderate Pain  or Fever. 30 tablet 0    losartan (COZAAR) 25 MG tablet Take 1 tablet by mouth Daily.      montelukast (SINGULAIR) 10 MG tablet Take 1 tablet by mouth Every Night.      orphenadrine (NORFLEX) 100 MG 12 hr tablet Take 1 tablet by mouth 2 (Two) Times a Day. (Patient not taking: Reported on 11/20/2024)      pantoprazole (PROTONIX) 20 MG EC tablet Take 1 tablet by mouth Daily.      QUEtiapine fumarate ER (SEROquel XR) 50 MG tablet sustained-release 24 hour tablet Take 1 tablet by mouth every night at bedtime. (Patient not taking: Reported on 11/20/2024)      Rimegepant  Sulfate (Nurtec) 75 MG tablet dispersible tablet Take  by mouth.      triamcinolone (KENALOG) 0.025 % cream Apply 1 Application topically to the appropriate area as directed 2 (Two) Times a Day.      Vitamin D, Cholecalciferol, (CHOLECALCIFEROL) 10 MCG (400 UNIT) tablet Take 1 tablet by mouth Daily.      ZOLMitriptan (ZOMIG) 5 MG nasal solution into the nostril(s) as directed by provider As Needed for Migraine. (Patient not taking: Reported on 11/20/2024)       No current facility-administered medications for this visit.       Review of Symptoms:    Review of Systems   Psychiatric/Behavioral:  Positive for depressed mood and stress. Negative for agitation. The patient is nervous/anxious.    All other systems reviewed and are negative.      Objective   Physical Exam:   Last menstrual period 09/04/2023.  There is no height or weight on file to calculate BMI.  No height and weight on file for this encounter.    02/07/25    MENTAL STATUS EXAM   General Appearance:  Cleanly groomed and dressed  Eye Contact:  Good eye contact  Attitude:  Cooperative  Motor Activity:  Normal gait, posture  Speech:  Normal rate, tone, volume  Language:  Spontaneous  Mood and affect:  Anxious  Hopelessness:  Denies  Thought Process:  Goal-directed  Associations/ Thought Content:  No delusions  Hallucinations:  None  Suicidal Ideations:  Not present  Homicidal Ideation:  Not present  Sensorium:  Alert  Orientation:  Person, place, time and situation  Insight:  Fair  Judgement:  Fair  Reliability:  Fair  Impulse Control:  Fair    PHQ-2 Total Score:      Lab Results:   No visits with results within 1 Month(s) from this visit.   Latest known visit with results is:   Office Visit on 11/20/2024   Component Date Value Ref Range Status    Total Cholesterol 11/20/2024 200  0 - 200 mg/dL Final    Triglycerides 11/20/2024 110  0 - 150 mg/dL Final    HDL Cholesterol 11/20/2024 56  40 - 60 mg/dL Final    LDL Cholesterol  11/20/2024 124 (H)  0 - 100 mg/dL  Final    VLDL Cholesterol 11/20/2024 20  5 - 40 mg/dL Final    LDL/HDL Ratio 11/20/2024 2.18   Final       Assessment & Plan   Problems Addressed this Visit    None  Diagnoses    None.         Social History     Tobacco Use   Smoking Status Never   Smokeless Tobacco Never       JOANNE reviewed and appropriate. Patient counseled on use of controlled substances.     -The benefits of a healthy diet and exercise were discussed with patient, especially the positive effects they have on mental health. Patient encouraged to consider lifestyle modification regarding  diet and exercise patterns to maximize results of mental health treatment.  -Reviewed previous available documentation  -Reviewed most recent available labs     -I've explained to her that drugs of the SSRI class can have side effects such as weight gain, sexual dysfunction, insomnia, headache, nausea. These medications are generally effective at alleviating symptoms of anxiety and/or depression. Let me know if significant side effects do occur.    -Previous psychiatric medications include lexapro, seroquel XR 50 mg, Buspar, Wellbutrin      Visit Diagnoses:  No diagnosis found.      TREATMENT PLAN/GOALS: Continue supportive psychotherapy efforts and medications as indicated. Treatment and medication options discussed during today's visit. Patient acknowledged and verbally consented to continue with current treatment plan and was educated on the importance of compliance with treatment and follow-up appointments.    MEDICATION ISSUES:  Discussed medication options and treatment plan of prescribed medication as well as the risks, benefits, and side effects including potential falls, possible impaired driving and metabolic adversities among others. Patient is agreeable to call the office with any worsening of symptoms or onset of side effects. Patient is agreeable to call 911 or go to the nearest ER should he/she begin having SI/HI.     MEDS ORDERED DURING  VISIT:  No orders of the defined types were placed in this encounter.    -Increase Fluoxetine 20 mg capsule, take 1 capsule daily.       No follow-ups on file.       Prognosis: Guarded dependent on medication/follow up and treatment plan compliance.  Functionality: pt showing improvements in important areas of daily functioning.     Short-term goals: Patient will adhere to medication regimen and note continued improvement in symptoms over the next 3 months.   Long-term goals: Patient will be adherent to medication management and psychotherapy with continued improvement in symptoms over the next 6 months.    I spent *** minutes caring for Debbie on this date of service. This time includes time spent by me in the following activities: {TIMEACTIVITIES:46585}            This document has been electronically signed by NIYAH Mullen   February 7, 2025 06:46 EST    Part of this note may be an electronic transcription/translation of spoken language to printed text using the Dragon Dictation System.

## 2025-02-12 ENCOUNTER — TELEMEDICINE (OUTPATIENT)
Dept: PSYCHIATRY | Facility: CLINIC | Age: 55
End: 2025-02-12
Payer: COMMERCIAL

## 2025-02-12 DIAGNOSIS — F33.2 SEVERE EPISODE OF RECURRENT MAJOR DEPRESSIVE DISORDER, WITHOUT PSYCHOTIC FEATURES: Primary | ICD-10-CM

## 2025-02-12 DIAGNOSIS — Z79.899 MEDICATION MANAGEMENT: ICD-10-CM

## 2025-02-12 DIAGNOSIS — F41.1 GENERALIZED ANXIETY DISORDER: ICD-10-CM

## 2025-02-12 DIAGNOSIS — F43.20 ADJUSTMENT DISORDER, UNSPECIFIED TYPE: ICD-10-CM

## 2025-02-12 RX ORDER — BUPROPION HYDROCHLORIDE 150 MG/1
150 TABLET ORAL EVERY MORNING
Qty: 30 TABLET | Refills: 0 | Status: SHIPPED | OUTPATIENT
Start: 2025-02-12 | End: 2026-02-12

## 2025-02-12 NOTE — PROGRESS NOTES
This provider is located at the [facility] (through [facility]), [address], [facility], [address] using a secure [employer] Video Visit through [employer]. Patient is being seen remotely via telehealth at their home address in [place], and stated they are in a secure environment for this session. The patient's condition being diagnosed/treated is appropriate for telemedicine. The provider identified herself as well as her credentials.   The patient, and/or patients guardian, consent to be seen remotely, and when consent is given they understand that the consent allows for patient identifiable information to be sent to a third party as needed.   They may refuse to be seen remotely at any time. The electronic data is encrypted and password protected, and the patient and/or guardian has been advised of the potential risks to privacy not withstanding such measures.    Mode of Visit: Video  Location of patient: -HOME-  Location of provider: +[facility]+  You have chosen to receive care through a telehealth visit.  The patient has signed the video visit consent form.  The visit included audio and video interaction. No technical issues occurred during this visit.      Subjective   Debbie Lanier is a 54 y.o. female who presents today for follow up    Chief Complaint:  Anxiety, depression    History of Present Illness:   History of Present Illness  Today is a follow-up visit.     Medication compliance: patient is compliant with medications, denies SE.  Depression rated 5/10, with 10 being the worst.  Anxiety rated 8/10, with 10 being the worst.    Sleep is poor, getting about 6 hours a night,  Nightmares: Occasional  Appetite is good.  Hallucinations: Patient denies auditory hallucinations and visual hallucinations  Self-harm: Patient denies thoughts of self-harm.  Alcohol use: no  Drug use: no  Marijuana use: no     Chronic health issues, no acute physical or medical issues  today.    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          The following portions of the patient's history were reviewed and updated as appropriate: allergies, current medications, past family history, past medical history, past social history, past surgical history and problem list.      Past Medical History:  Past Medical History:   Diagnosis Date    Anxiety     Asthma     Cholelithiasis 07/23    7% function    Colon polyp 05/2019    Depression     Disease of thyroid gland 1998    Hypothyroidism    Diverticulitis of colon 05/ 2020    Mild    Fibrocystic breast 08/2019    GERD (gastroesophageal reflux disease)     Hiatal hernia     Hypertension 11/2018    Take low does of medicine    MVP (mitral valve prolapse)     Panic disorder        Social History:  Social History     Socioeconomic History    Marital status:    Tobacco Use    Smoking status: Never    Smokeless tobacco: Never   Vaping Use    Vaping status: Never Used   Substance and Sexual Activity    Alcohol use: No    Drug use: No    Sexual activity: Not Currently     Partners: Male     Birth control/protection: Tubal ligation       Family History:  Family History   Problem Relation Age of Onset    Thyroid disease Mother     Diabetes Mother         Watches diet    Depression Mother     Anxiety disorder Mother     Bipolar disorder Mother     Dementia Mother     Suicide Attempts Brother     Breast cancer Neg Hx        Past Surgical History:  Past Surgical History:   Procedure Laterality Date    ABDOMINAL SURGERY  9/23    Gallbladder surgery    CHOLECYSTECTOMY N/A 09/07/2023    Procedure: CHOLECYSTECTOMY LAPAROSCOPIC;  Surgeon: Jose M Barreto MD;  Location: [facility];  Service: General;  Laterality: N/A;    COLONOSCOPY  2023    D & C AND LAPAROSCOPY       ENDOSCOPY      HEMORRHOIDECTOMY  5/2005    TONSILLECTOMY      TUBAL ABDOMINAL LIGATION         Problem List:  Patient Active Problem List   Diagnosis    URI (upper respiratory infection)    Status post laparoscopic cholecystectomy        Allergy:   Allergies   Allergen Reactions    Indocin [Indomethacin] Itching    Tramadol Itching        Current Medications:   Current Outpatient Medications   Medication Sig Dispense Refill    azelastine (ASTELIN) 0.1 % nasal spray Administer 2 sprays into the nostril(s) as directed by provider 2 (Two) Times a Day. Use in each nostril as directed      Benralizumab (Fasenra) 30 MG/ML solution prefilled syringe Inject  under the skin into the appropriate area as directed.      Budeson-Glycopyrrol-Formoterol (Breztri Aerosphere) 160-9-4.8 MCG/ACT aerosol inhaler Inhale 2 puffs 2 (Two) Times a Day.      buPROPion XL (Wellbutrin XL) 150 MG 24 hr tablet Take 1 tablet by mouth Every Morning. 30 tablet 0    carvedilol (COREG) 6.25 MG tablet Take 1 tablet by mouth 2 (Two) Times a Day With Meals. (Patient not taking: Reported on 11/20/2024)      Dupilumab (Dupixent) 300 MG/2ML solution pen-injector Inject  under the skin into the appropriate area as directed. (Patient not taking: Reported on 11/20/2024)      estradiol-norethindrone (COMBIPATCH) 0.05-0.14 MG/DAY patch Place 1 patch on the skin as directed by provider 2 (Two) Times a Week. (Patient not taking: Reported on 11/20/2024)      fexofenadine (ALLEGRA) 180 MG tablet Take 1 tablet by mouth Daily.      fluticasone-salmeterol (ADVAIR) 100-50 MCG/DOSE DISKUS Inhale 2 (two) times a day. (Patient not taking: Reported on 11/20/2024)      HYDROcodone-acetaminophen (NORCO) 5-325 MG per tablet Take 1 tablet by mouth every 4 hours as needed for pain. (Patient not taking: Reported on 11/20/2024) 10 tablet 0    ibuprofen (ADVIL,MOTRIN) 800 MG tablet Take 1 tablet by mouth 3 (Three) Times a Day As Needed for Mild Pain , Moderate Pain  or Fever. 30  tablet 0    losartan (COZAAR) 25 MG tablet Take 1 tablet by mouth Daily.      montelukast (SINGULAIR) 10 MG tablet Take 1 tablet by mouth Every Night.      orphenadrine (NORFLEX) 100 MG 12 hr tablet Take 1 tablet by mouth 2 (Two) Times a Day. (Patient not taking: Reported on 11/20/2024)      pantoprazole (PROTONIX) 20 MG EC tablet Take 1 tablet by mouth Daily.      QUEtiapine fumarate ER (SEROquel XR) 50 MG tablet sustained-release 24 hour tablet Take 1 tablet by mouth every night at bedtime. (Patient not taking: Reported on 11/20/2024)      Rimegepant Sulfate (Nurtec) 75 MG tablet dispersible tablet Take  by mouth.      triamcinolone (KENALOG) 0.025 % cream Apply 1 Application topically to the appropriate area as directed 2 (Two) Times a Day.      Vitamin D, Cholecalciferol, (CHOLECALCIFEROL) 10 MCG (400 UNIT) tablet Take 1 tablet by mouth Daily.      ZOLMitriptan (ZOMIG) 5 MG nasal solution into the nostril(s) as directed by provider As Needed for Migraine. (Patient not taking: Reported on 11/20/2024)       No current facility-administered medications for this visit.       Review of Symptoms:    Review of Systems   Neurological:  Negative for seizures.   Psychiatric/Behavioral:  Positive for sleep disturbance, depressed mood and stress. Negative for dysphoric mood, hallucinations, self-injury and suicidal ideas. The patient is nervous/anxious.    All other systems reviewed and are negative.        Physical Exam:   Last menstrual period 09/04/2023.  There is no height or weight on file to calculate BMI.  No height and weight on file for this encounter.    02/12/25  MENTAL STATUS EXAM   General Appearance:  Cleanly groomed and dressed  Eye Contact:  Good eye contact  Attitude:  Cooperative  Motor Activity:  Normal gait, posture  Speech:  Normal rate, tone, volume  Language:  Spontaneous  Mood and affect:  Normal, pleasant  Hopelessness:  Denies  Thought Process:  Logical  Associations/ Thought Content:  No  delusions  Hallucinations:  None  Suicidal Ideations:  Not present  Homicidal Ideation:  Not present  Sensorium:  Alert  Orientation:  Person, place, time and situation  Insight:  Fair  Judgement:  Fair  Reliability:  Fair  Impulse Control:  Fair      PHQ-Score Total:  PHQ-9 Total Score: (Patient-Rptd) 6      Lab Results:   No visits with results within 1 Month(s) from this visit.   Latest known visit with results is:   Office Visit on 11/20/2024   Component Date Value Ref Range Status    Total Cholesterol 11/20/2024 200  0 - 200 mg/dL Final    Triglycerides 11/20/2024 110  0 - 150 mg/dL Final    HDL Cholesterol 11/20/2024 56  40 - 60 mg/dL Final    LDL Cholesterol  11/20/2024 124 (H)  0 - 100 mg/dL Final    VLDL Cholesterol 11/20/2024 20  5 - 40 mg/dL Final    LDL/HDL Ratio 11/20/2024 2.18   Final       Assessment & Plan   Problems Addressed this Visit    None  Visit Diagnoses       Severe episode of recurrent major depressive disorder, without psychotic features    -  Primary    Relevant Medications    buPROPion XL (Wellbutrin XL) 150 MG 24 hr tablet    Generalized anxiety disorder        Relevant Medications    buPROPion XL (Wellbutrin XL) 150 MG 24 hr tablet    Medication management        Adjustment disorder, unspecified type        Relevant Medications    buPROPion XL (Wellbutrin XL) 150 MG 24 hr tablet          Diagnoses         Codes Comments    Severe episode of recurrent major depressive disorder, without psychotic features    -  Primary ICD-10-CM: F33.2  ICD-9-CM: 296.33     Generalized anxiety disorder     ICD-10-CM: F41.1  ICD-9-CM: 300.02     Medication management     ICD-10-CM: Z79.899  ICD-9-CM: V58.69     Adjustment disorder, unspecified type     ICD-10-CM: F43.20  ICD-9-CM: 309.9             Social History     Tobacco Use   Smoking Status Never   Smokeless Tobacco Never       JOANNE reviewed and appropriate. Patient counseled on use of controlled substances.     -The benefits of a healthy diet and  exercise were discussed with patient, especially the positive effects they have on mental health. Patient encouraged to consider lifestyle modification regarding  diet and exercise patterns to maximize results of mental health treatment.  -Reviewed previous available documentation  -Reviewed most recent available labs     -Pt has no previous history of seizure or current eating disorder, which would be a contraindication for use. The possibility of activation with initiation was discussed and patient verbalizes understanding.    -Previous medications include Prozac, Lexapro, Wellbutrin    Visit Diagnoses:    ICD-10-CM ICD-9-CM   1. Severe episode of recurrent major depressive disorder, without psychotic features  F33.2 296.33   2. Generalized anxiety disorder  F41.1 300.02   3. Medication management  Z79.899 V58.69   4. Adjustment disorder, unspecified type  F43.20 309.9       TREATMENT PLAN/GOALS: Continue supportive psychotherapy efforts and medications as indicated. Treatment and medication options discussed during today's visit. Patient acknowledged and verbally consented to continue with current treatment plan and was educated on the importance of compliance with treatment and follow-up appointments.    MEDICATION ISSUES:    Discussed medication options and treatment plan of prescribed medication as well as the risks, benefits, and side effects including potential falls, possible impaired driving and metabolic adversities among others. Patient is agreeable to call the office with any worsening of symptoms or onset of side effects. Patient is agreeable to call 911 or go to the nearest ER should he/she begin having SI/HI.     MEDS ORDERED DURING VISIT:  New Medications Ordered This Visit   Medications    buPROPion XL (Wellbutrin XL) 150 MG 24 hr tablet     Sig: Take 1 tablet by mouth Every Morning.     Dispense:  30 tablet     Refill:  0     -D/C Prozac  -Start Wellbutrin  mg tablet, take 1 tablet every  morning for depression and anxiety.    Return in about 1 month (around 3/12/2025).       I spent 30 minutes caring for Debbie on this date of service. This time includes time spent by me in the following activities: preparing for the visit, performing a medically appropriate examination and/or evaluation, counseling and educating the patient/family/caregiver, documenting information in the medical record, and ordering medications        This document has been electronically signed by NIYAH Mullen  February 12, 2025 09:22 EST    Part of this note may be an electronic transcription/translation of spoken language to printed text using the Dragon Dictation System.

## 2025-03-10 NOTE — PROGRESS NOTES
This provider is located at the [facility] (through [facility]), [address], [place], [address] using a secure [employer] Video Visit through [employer]. Patient is being seen remotely via telehealth at their home address in [place], and stated they are in a secure environment for this session. The patient's condition being diagnosed/treated is appropriate for telemedicine. The provider identified herself as well as her credentials.   The patient, and/or patients guardian, consent to be seen remotely, and when consent is given they understand that the consent allows for patient identifiable information to be sent to a third party as needed.   They may refuse to be seen remotely at any time. The electronic data is encrypted and password protected, and the patient and/or guardian has been advised of the potential risks to privacy not withstanding such measures.    Mode of Visit: Video  Location of patient: -HOME-  Location of provider: +[facility]+  You have chosen to receive care through a telehealth visit.  The patient has signed the video visit consent form.  The visit included audio and video interaction. No technical issues occurred during this visit.      Subjective   Debbie Lanier is a 54 y.o. female who presents today for follow up    Chief Complaint:  Anxiety, depression    History of Present Illness:   History of Present Illness  Today is a follow-up visit.   {tscschool option (Optional):31273}  Medication compliance: patient {tscmedicationcomp:51238}  Depression rated {numbers:90314}/10, with 10 being the worst.  Anxiety rated {numbers:76701}/10, with 10 being the worst.  {mood/adhd (Optional):00686}  Sleep is {good/fair/poor:656806795}, getting about {NUMBERS 0-24:38478} hours a night,  Nightmares: {nightmares:04730}  Appetite is {good/fair/poor:813765934}.  Hallucinations: Patient {tscselfharm:74049}  Self-harm: Patient {tscselfharm:06728}  Alcohol use:  {YES:66543}  Drug use: {YES:44738}  Marijuana use: {YES:27356}     Chronic health issues, no acute physical or medical issues today.    Details:        Today is a follow-up visit.   {tscschool option (Optional):00714}  Medication compliance: patient is compliant with medications, denies SE.  Depression rated 5/10, with 10 being the worst.  Anxiety rated 8/10, with 10 being the worst.  {mood/adhd (Optional):36986}  Sleep is poor, getting about 6 hours a night,  Nightmares: Occasional  Appetite is good.  Hallucinations: Patient denies auditory hallucinations and visual hallucinations  Self-harm: Patient denies thoughts of self-harm.  Alcohol use: no  Drug use: no  Marijuana use: no     Chronic health issues, no acute physical or medical issues today.    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          The following portions of the patient's history were reviewed and updated as appropriate: allergies, current medications, past family history, past medical history, past social history, past surgical history and problem list.      Past Medical History:  Past Medical History:   Diagnosis Date    Anxiety     Asthma     Cholelithiasis 07/23    7% function    Colon polyp 05/2019    Depression     Disease of thyroid gland 1998    Hypothyroidism    Diverticulitis of colon 05/ 2020    Mild    Fibrocystic breast 08/2019    GERD (gastroesophageal reflux disease)     Hiatal hernia     Hypertension 11/2018    Take low does of medicine    MVP (mitral valve prolapse)     Panic disorder        Social History:  Social History     Socioeconomic History    Marital status:    Tobacco Use    Smoking status: Never    Smokeless tobacco: Never   Vaping Use    Vaping status: Never Used   Substance and Sexual Activity    Alcohol use: No     Drug use: No    Sexual activity: Not Currently     Partners: Male     Birth control/protection: Tubal ligation       Family History:  Family History   Problem Relation Age of Onset    Thyroid disease Mother     Diabetes Mother         Watches diet    Depression Mother     Anxiety disorder Mother     Bipolar disorder Mother     Dementia Mother     Suicide Attempts Brother     Breast cancer Neg Hx        Past Surgical History:  Past Surgical History:   Procedure Laterality Date    ABDOMINAL SURGERY  9/23    Gallbladder surgery    CHOLECYSTECTOMY N/A 09/07/2023    Procedure: CHOLECYSTECTOMY LAPAROSCOPIC;  Surgeon: Jose M Barreto MD;  Location: [facility];  Service: General;  Laterality: N/A;    COLONOSCOPY  2023    D & C AND LAPAROSCOPY      ENDOSCOPY      HEMORRHOIDECTOMY  5/2005    TONSILLECTOMY      TUBAL ABDOMINAL LIGATION         Problem List:  Patient Active Problem List   Diagnosis    URI (upper respiratory infection)    Status post laparoscopic cholecystectomy        Allergy:   Allergies   Allergen Reactions    Indocin [Indomethacin] Itching    Tramadol Itching        Current Medications:   Current Outpatient Medications   Medication Sig Dispense Refill    azelastine (ASTELIN) 0.1 % nasal spray Administer 2 sprays into the nostril(s) as directed by provider 2 (Two) Times a Day. Use in each nostril as directed      Benralizumab (Fasenra) 30 MG/ML solution prefilled syringe Inject  under the skin into the appropriate area as directed.      Budeson-Glycopyrrol-Formoterol (Breztri Aerosphere) 160-9-4.8 MCG/ACT aerosol inhaler Inhale 2 puffs 2 (Two) Times a Day.      buPROPion XL (Wellbutrin XL) 150 MG 24 hr tablet Take 1 tablet by mouth Every Morning. 30 tablet 0    carvedilol (COREG) 6.25 MG tablet Take 1 tablet by mouth 2 (Two) Times a Day With Meals. (Patient not taking: Reported on 11/20/2024)      Dupilumab (Dupixent) 300 MG/2ML solution pen-injector Inject  under the skin into the appropriate area as  directed. (Patient not taking: Reported on 11/20/2024)      estradiol-norethindrone (COMBIPATCH) 0.05-0.14 MG/DAY patch Place 1 patch on the skin as directed by provider 2 (Two) Times a Week. (Patient not taking: Reported on 11/20/2024)      fexofenadine (ALLEGRA) 180 MG tablet Take 1 tablet by mouth Daily.      fluticasone-salmeterol (ADVAIR) 100-50 MCG/DOSE DISKUS Inhale 2 (two) times a day. (Patient not taking: Reported on 11/20/2024)      HYDROcodone-acetaminophen (NORCO) 5-325 MG per tablet Take 1 tablet by mouth every 4 hours as needed for pain. (Patient not taking: Reported on 11/20/2024) 10 tablet 0    ibuprofen (ADVIL,MOTRIN) 800 MG tablet Take 1 tablet by mouth 3 (Three) Times a Day As Needed for Mild Pain , Moderate Pain  or Fever. 30 tablet 0    losartan (COZAAR) 25 MG tablet Take 1 tablet by mouth Daily.      montelukast (SINGULAIR) 10 MG tablet Take 1 tablet by mouth Every Night.      orphenadrine (NORFLEX) 100 MG 12 hr tablet Take 1 tablet by mouth 2 (Two) Times a Day. (Patient not taking: Reported on 11/20/2024)      pantoprazole (PROTONIX) 20 MG EC tablet Take 1 tablet by mouth Daily.      QUEtiapine fumarate ER (SEROquel XR) 50 MG tablet sustained-release 24 hour tablet Take 1 tablet by mouth every night at bedtime. (Patient not taking: Reported on 11/20/2024)      Rimegepant Sulfate (Nurtec) 75 MG tablet dispersible tablet Take  by mouth.      triamcinolone (KENALOG) 0.025 % cream Apply 1 Application topically to the appropriate area as directed 2 (Two) Times a Day.      Vitamin D, Cholecalciferol, (CHOLECALCIFEROL) 10 MCG (400 UNIT) tablet Take 1 tablet by mouth Daily.      ZOLMitriptan (ZOMIG) 5 MG nasal solution into the nostril(s) as directed by provider As Needed for Migraine. (Patient not taking: Reported on 11/20/2024)       No current facility-administered medications for this visit.       Review of Symptoms:    Review of Systems   Neurological:  Negative for seizures.    Psychiatric/Behavioral:  Positive for sleep disturbance, depressed mood and stress. The patient is nervous/anxious.    All other systems reviewed and are negative.        Physical Exam:   Last menstrual period 09/04/2023.  There is no height or weight on file to calculate BMI.  No height and weight on file for this encounter.    03/10/25  MENTAL STATUS EXAM   General Appearance:  Cleanly groomed and dressed  Eye Contact:  Good eye contact  Attitude:  Cooperative  Motor Activity:  Normal gait, posture  Speech:  Normal rate, tone, volume  Language:  Spontaneous  Mood and affect:  Normal, pleasant  Hopelessness:  Denies  Thought Process:  Logical  Associations/ Thought Content:  No delusions  Hallucinations:  None  Suicidal Ideations:  Not present  Homicidal Ideation:  Not present  Sensorium:  Alert  Orientation:  Person, place, time and situation  Insight:  Fair  Judgement:  Fair  Reliability:  Fair  Impulse Control:  Fair      PHQ-Score Total:  PHQ-9 Total Score:        Lab Results:   No visits with results within 1 Month(s) from this visit.   Latest known visit with results is:   Office Visit on 11/20/2024   Component Date Value Ref Range Status    Total Cholesterol 11/20/2024 200  0 - 200 mg/dL Final    Triglycerides 11/20/2024 110  0 - 150 mg/dL Final    HDL Cholesterol 11/20/2024 56  40 - 60 mg/dL Final    LDL Cholesterol  11/20/2024 124 (H)  0 - 100 mg/dL Final    VLDL Cholesterol 11/20/2024 20  5 - 40 mg/dL Final    LDL/HDL Ratio 11/20/2024 2.18   Final       Assessment & Plan   Problems Addressed this Visit    None  Visit Diagnoses         Severe episode of recurrent major depressive disorder, without psychotic features    -  Primary      Generalized anxiety disorder          Adjustment disorder, unspecified type          Medication management              Diagnoses         Codes Comments      Severe episode of recurrent major depressive disorder, without psychotic features    -  Primary ICD-10-CM:  F33.2  ICD-9-CM: 296.33       Generalized anxiety disorder     ICD-10-CM: F41.1  ICD-9-CM: 300.02       Adjustment disorder, unspecified type     ICD-10-CM: F43.20  ICD-9-CM: 309.9       Medication management     ICD-10-CM: Z79.899  ICD-9-CM: V58.69             Social History     Tobacco Use   Smoking Status Never   Smokeless Tobacco Never       JOANNE reviewed and appropriate. Patient counseled on use of controlled substances.     -The benefits of a healthy diet and exercise were discussed with patient, especially the positive effects they have on mental health. Patient encouraged to consider lifestyle modification regarding  diet and exercise patterns to maximize results of mental health treatment.  -Reviewed previous available documentation  -Reviewed most recent available labs     -Pt has no previous history of seizure or current eating disorder, which would be a contraindication for use. The possibility of activation with initiation was discussed and patient verbalizes understanding.    -Previous medications include Prozac, Lexapro, Wellbutrin    Visit Diagnoses:    ICD-10-CM ICD-9-CM   1. Severe episode of recurrent major depressive disorder, without psychotic features  F33.2 296.33   2. Generalized anxiety disorder  F41.1 300.02   3. Adjustment disorder, unspecified type  F43.20 309.9   4. Medication management  Z79.899 V58.69         TREATMENT PLAN/GOALS: Continue supportive psychotherapy efforts and medications as indicated. Treatment and medication options discussed during today's visit. Patient acknowledged and verbally consented to continue with current treatment plan and was educated on the importance of compliance with treatment and follow-up appointments.    MEDICATION ISSUES:    Discussed medication options and treatment plan of prescribed medication as well as the risks, benefits, and side effects including potential falls, possible impaired driving and metabolic adversities among others. Patient is  agreeable to call the office with any worsening of symptoms or onset of side effects. Patient is agreeable to call 911 or go to the nearest ER should he/she begin having SI/HI.     MEDS ORDERED DURING VISIT:  No orders of the defined types were placed in this encounter.    -D/C Prozac  -Start Wellbutrin  mg tablet, take 1 tablet every morning for depression and anxiety.    No follow-ups on file.       I spent *** minutes caring for Debbie on this date of service. This time includes time spent by me in the following activities: {TIMEACTIVITIES:83637}        This document has been electronically signed by NIYAH Mullen  March 10, 2025 06:26 EDT    Part of this note may be an electronic transcription/translation of spoken language to printed text using the Dragon Dictation System.

## 2025-03-13 ENCOUNTER — TELEMEDICINE (OUTPATIENT)
Dept: PSYCHIATRY | Facility: CLINIC | Age: 55
End: 2025-03-13
Payer: MEDICAID

## 2025-03-13 DIAGNOSIS — Z79.899 MEDICATION MANAGEMENT: ICD-10-CM

## 2025-03-13 DIAGNOSIS — F41.1 GENERALIZED ANXIETY DISORDER: ICD-10-CM

## 2025-03-13 DIAGNOSIS — F43.20 ADJUSTMENT DISORDER, UNSPECIFIED TYPE: ICD-10-CM

## 2025-03-13 DIAGNOSIS — F33.2 SEVERE EPISODE OF RECURRENT MAJOR DEPRESSIVE DISORDER, WITHOUT PSYCHOTIC FEATURES: Primary | ICD-10-CM

## 2025-03-13 RX ORDER — BUPROPION HYDROCHLORIDE 300 MG/1
300 TABLET ORAL EVERY MORNING
Qty: 30 TABLET | Refills: 1 | Status: SHIPPED | OUTPATIENT
Start: 2025-03-13 | End: 2026-03-13

## 2025-03-13 NOTE — PROGRESS NOTES
This provider is located at the [facility] (through [facility]), [address], [facility], [address] using a secure Pocketbook[employer] Video Visit through [employer]. Patient is being seen remotely via telehealth at their home address in [place], and stated they are in a secure environment for this session. The patient's condition being diagnosed/treated is appropriate for telemedicine. The provider identified herself as well as her credentials.   The patient, and/or patients guardian, consent to be seen remotely, and when consent is given they understand that the consent allows for patient identifiable information to be sent to a third party as needed.   They may refuse to be seen remotely at any time. The electronic data is encrypted and password protected, and the patient and/or guardian has been advised of the potential risks to privacy not withstanding such measures.    Mode of Visit: Video  Location of patient: -HOME-  Location of provider: +[facility]+  You have chosen to receive care through a telehealth visit.  Does the patient consent to use a video/audio connection for your medical care today? Yes  The visit included audio and video interaction. No technical issues occurred during this visit.      Subjective   Debbie Lanier is a 54 y.o. female who presents today for follow up    Chief Complaint:  Anxiety and Depression    History of Present Illness:   History of Present Illness   History of Present Illness  The patient is a 54-year-old female who presents via virtual visit for follow-up of anxiety and depression.    She reports persistent nervousness and frequent episodes of crying, leading her to believe that an escalation in her medication dosage may be necessary. She rates her anxiety at 8 out of 10. Her mood stability is rated at 8 out of 10, with frequent transitions from crying to attempting to maintain a calm demeanor. Despite these efforts, she occasionally  perceives herself as irritable or unkind, which she attributes to her current state of distress. She is not experiencing any side effects from her current medication regimen but questions its efficacy in managing her symptoms. She has recently started a new job as a  for a company that hires at [employer], which has been a source of significant stress, manifesting as heightened anxiety. She is currently in her fifth week at her new job. She has been unable to continue therapy due to a change in insurance providers and is awaiting the arrival of her new insurance card.    She rates her depression at 7 out of 10. Her sleep quality is fair, often disrupted by awakenings during the night, resulting in an average of 6 hours of sleep per night. She does not report any nightmares. Her appetite remains good, and she does not endorse any auditory or visual hallucinations. She also does not report any suicidal ideation. She has been on Wellbutrin XL, which was recently added to her treatment plan. However, she experienced an exacerbation of her asthma symptoms, including wheezing, during the initial weeks of Wellbutrin XL treatment, necessitating the use of steroids. The side effects of the steroids, including jitteriness and insomnia, have made it difficult for her to assess the effectiveness of Wellbutrin XL.    SOCIAL HISTORY  She does not report any alcohol intake, drug use, or marijuana use. She is currently in her fifth week at her new job as a  for a company that hires of a [employer].    MEDICATIONS  Current: Wellbutrin XL, Prozac    The following portions of the patient's history were reviewed and updated as appropriate: allergies, current medications, past family history, past medical history, past social history, past surgical history and problem list.      Past Medical History:  Past Medical History:   Diagnosis Date    Anxiety     Asthma     Cholelithiasis 07/23    7% function     Colon polyp 05/2019    Depression     Disease of thyroid gland 1998    Hypothyroidism    Diverticulitis of colon 05/ 2020    Mild    Fibrocystic breast 08/2019    GERD (gastroesophageal reflux disease)     Hiatal hernia     Hypertension 11/2018    Take low does of medicine    MVP (mitral valve prolapse)     Panic disorder        Social History:  Social History     Socioeconomic History    Marital status:    Tobacco Use    Smoking status: Never    Smokeless tobacco: Never   Vaping Use    Vaping status: Never Used   Substance and Sexual Activity    Alcohol use: No    Drug use: No    Sexual activity: Not Currently     Partners: Male     Birth control/protection: Tubal ligation       Family History:  Family History   Problem Relation Age of Onset    Thyroid disease Mother     Diabetes Mother         Watches diet    Depression Mother     Anxiety disorder Mother     Bipolar disorder Mother     Dementia Mother     Suicide Attempts Brother     Breast cancer Neg Hx        Past Surgical History:  Past Surgical History:   Procedure Laterality Date    ABDOMINAL SURGERY  9/23    Gallbladder surgery    CHOLECYSTECTOMY N/A 09/07/2023    Procedure: CHOLECYSTECTOMY LAPAROSCOPIC;  Surgeon: Jose M Barreto MD;  Location: [facility];  Service: General;  Laterality: N/A;    COLONOSCOPY  2023    D & C AND LAPAROSCOPY      ENDOSCOPY      HEMORRHOIDECTOMY  5/2005    TONSILLECTOMY      TUBAL ABDOMINAL LIGATION         Problem List:  Patient Active Problem List   Diagnosis    URI (upper respiratory infection)    Status post laparoscopic cholecystectomy        Allergy:   Allergies   Allergen Reactions    Indocin [Indomethacin] Itching    Tramadol Itching        Current Medications:   Current Outpatient Medications   Medication Sig Dispense Refill    buPROPion XL (Wellbutrin XL) 300 MG 24 hr tablet Take 1 tablet by mouth Every Morning. 30 tablet 1    azelastine (ASTELIN) 0.1 % nasal spray Administer 2 sprays into the nostril(s) as  directed by provider 2 (Two) Times a Day. Use in each nostril as directed      Benralizumab (Fasenra) 30 MG/ML solution prefilled syringe Inject  under the skin into the appropriate area as directed.      Budeson-Glycopyrrol-Formoterol (Breztri Aerosphere) 160-9-4.8 MCG/ACT aerosol inhaler Inhale 2 puffs 2 (Two) Times a Day.      carvedilol (COREG) 6.25 MG tablet Take 1 tablet by mouth 2 (Two) Times a Day With Meals. (Patient not taking: Reported on 11/20/2024)      Dupilumab (Dupixent) 300 MG/2ML solution pen-injector Inject  under the skin into the appropriate area as directed. (Patient not taking: Reported on 11/20/2024)      estradiol-norethindrone (COMBIPATCH) 0.05-0.14 MG/DAY patch Place 1 patch on the skin as directed by provider 2 (Two) Times a Week. (Patient not taking: Reported on 11/20/2024)      fexofenadine (ALLEGRA) 180 MG tablet Take 1 tablet by mouth Daily.      fluticasone-salmeterol (ADVAIR) 100-50 MCG/DOSE DISKUS Inhale 2 (two) times a day. (Patient not taking: Reported on 11/20/2024)      HYDROcodone-acetaminophen (NORCO) 5-325 MG per tablet Take 1 tablet by mouth every 4 hours as needed for pain. (Patient not taking: Reported on 11/20/2024) 10 tablet 0    ibuprofen (ADVIL,MOTRIN) 800 MG tablet Take 1 tablet by mouth 3 (Three) Times a Day As Needed for Mild Pain , Moderate Pain  or Fever. 30 tablet 0    losartan (COZAAR) 25 MG tablet Take 1 tablet by mouth Daily.      montelukast (SINGULAIR) 10 MG tablet Take 1 tablet by mouth Every Night.      orphenadrine (NORFLEX) 100 MG 12 hr tablet Take 1 tablet by mouth 2 (Two) Times a Day. (Patient not taking: Reported on 11/20/2024)      pantoprazole (PROTONIX) 20 MG EC tablet Take 1 tablet by mouth Daily.      QUEtiapine fumarate ER (SEROquel XR) 50 MG tablet sustained-release 24 hour tablet Take 1 tablet by mouth every night at bedtime. (Patient not taking: Reported on 11/20/2024)      Rimegepant Sulfate (Nurtec) 75 MG tablet dispersible tablet Take  by  mouth.      triamcinolone (KENALOG) 0.025 % cream Apply 1 Application topically to the appropriate area as directed 2 (Two) Times a Day.      Vitamin D, Cholecalciferol, (CHOLECALCIFEROL) 10 MCG (400 UNIT) tablet Take 1 tablet by mouth Daily.      ZOLMitriptan (ZOMIG) 5 MG nasal solution into the nostril(s) as directed by provider As Needed for Migraine. (Patient not taking: Reported on 11/20/2024)       No current facility-administered medications for this visit.       Review of Symptoms:    Review of Systems   Psychiatric/Behavioral:  Positive for dysphoric mood, sleep disturbance, depressed mood and stress. Negative for hallucinations, self-injury and suicidal ideas. The patient is nervous/anxious.    All other systems reviewed and are negative.        Physical Exam:   Last menstrual period 09/04/2023.  There is no height or weight on file to calculate BMI.  No height and weight on file for this encounter.    03/13/25  MENTAL STATUS EXAM   General Appearance:  Cleanly groomed and dressed  Eye Contact:  Good eye contact  Attitude:  Cooperative  Motor Activity:  Normal gait, posture  Speech:  Normal rate, tone, volume  Language:  Spontaneous  Mood and affect:  Anxious, depressed and other  Other Comment:  Tearful  Hopelessness:  Denies  Thought Process:  Logical  Associations/ Thought Content:  No delusions  Hallucinations:  None  Suicidal Ideations:  Not present  Homicidal Ideation:  Not present  Sensorium:  Alert  Orientation:  Person, place, time and situation  Insight:  Fair  Judgement:  Fair  Reliability:  Fair  Impulse Control:  Fair      PHQ-Score Total:  PHQ-9 Total Score:        Lab Results:   No visits with results within 1 Month(s) from this visit.   Latest known visit with results is:   Office Visit on 11/20/2024   Component Date Value Ref Range Status    Total Cholesterol 11/20/2024 200  0 - 200 mg/dL Final    Triglycerides 11/20/2024 110  0 - 150 mg/dL Final    HDL Cholesterol 11/20/2024 56  40 - 60  mg/dL Final    LDL Cholesterol  11/20/2024 124 (H)  0 - 100 mg/dL Final    VLDL Cholesterol 11/20/2024 20  5 - 40 mg/dL Final    LDL/HDL Ratio 11/20/2024 2.18   Final     Results      Assessment & Plan   Problems Addressed this Visit    None  Visit Diagnoses         Severe episode of recurrent major depressive disorder, without psychotic features    -  Primary    Relevant Medications    buPROPion XL (Wellbutrin XL) 300 MG 24 hr tablet      Generalized anxiety disorder        Relevant Medications    buPROPion XL (Wellbutrin XL) 300 MG 24 hr tablet      Adjustment disorder, unspecified type        Relevant Medications    buPROPion XL (Wellbutrin XL) 300 MG 24 hr tablet      Medication management              Diagnoses         Codes Comments      Severe episode of recurrent major depressive disorder, without psychotic features    -  Primary ICD-10-CM: F33.2  ICD-9-CM: 296.33       Generalized anxiety disorder     ICD-10-CM: F41.1  ICD-9-CM: 300.02       Adjustment disorder, unspecified type     ICD-10-CM: F43.20  ICD-9-CM: 309.9       Medication management     ICD-10-CM: Z79.899  ICD-9-CM: V58.69           Assessment & Plan  1. Anxiety.  She reports significant anxiety, rating it as an 8 out of 10. She experiences daily nervousness and hand tremors, particularly related to her new job. She is currently on bupropion XL. The dosage of bupropion XL will be increased to 300 mg once daily to better manage her symptoms. The prescription will be sent to [facility].    2. Depression.  She reports a depression level of 7 out of 10 and experiences frequent crying spells and emotional instability. She is currently on bupropion XL. The dosage of bupropion XL will be increased to 300 mg once daily to help alleviate her depressive symptoms. The prescription will be sent to [facility].       Social History     Tobacco Use   Smoking Status Never   Smokeless Tobacco Never       JOANNE reviewed and appropriate. Patient  counseled on use of controlled substances.     -The benefits of a healthy diet and exercise were discussed with patient, especially the positive effects they have on mental health. Patient encouraged to consider lifestyle modification regarding  diet and exercise patterns to maximize results of mental health treatment.  -Reviewed previous available documentation  -Reviewed most recent available labs     -Pt has no previous history of seizure or current eating disorder, which would be a contraindication for use. The possibility of activation with initiation was discussed and patient verbalizes understanding.        Visit Diagnoses:    ICD-10-CM ICD-9-CM   1. Severe episode of recurrent major depressive disorder, without psychotic features  F33.2 296.33   2. Generalized anxiety disorder  F41.1 300.02   3. Adjustment disorder, unspecified type  F43.20 309.9   4. Medication management  Z79.899 V58.69       TREATMENT PLAN/GOALS: Continue supportive psychotherapy efforts and medications as indicated. Treatment and medication options discussed during today's visit. Patient acknowledged and verbally consented to continue with current treatment plan and was educated on the importance of compliance with treatment and follow-up appointments.    MEDICATION ISSUES:    Discussed medication options and treatment plan of prescribed medication as well as the risks, benefits, and side effects including potential falls, possible impaired driving and metabolic adversities among others. Patient is agreeable to call the office with any worsening of symptoms or onset of side effects. Patient is agreeable to call 911 or go to the nearest ER should he/she begin having SI/HI.     MEDS ORDERED DURING VISIT:  New Medications Ordered This Visit   Medications    buPROPion XL (Wellbutrin XL) 300 MG 24 hr tablet     Sig: Take 1 tablet by mouth Every Morning.     Dispense:  30 tablet     Refill:  1       Return in about 2 months (around  5/13/2025).       I spent 30 minutes caring for Debbie on this date of service. This time includes time spent by me in the following activities: preparing for the visit, performing a medically appropriate examination and/or evaluation, counseling and educating the patient/family/caregiver, documenting information in the medical record, and ordering medications        This document has been electronically signed by NIYAH Mullen  March 13, 2025 10:30 EDT    Patient or patient representative verbalized consent for the use of Ambient Listening during the visit with  NIYAH Mullen for chart documentation. 3/13/2025  08:26 EDT    Part of this note may be an electronic transcription/translation of spoken language to printed text using the Dragon Dictation System.

## 2025-04-29 ENCOUNTER — TELEPHONE (OUTPATIENT)
Dept: PSYCHIATRY | Facility: CLINIC | Age: 55
End: 2025-04-29
Payer: COMMERCIAL

## 2025-04-30 NOTE — PROGRESS NOTES
"She states she stopped taking Welllbutrin, it caused increased depression and felt bad, then she resumed a previous prescription of prozac, she is sleeping \"all of the time\", no motivation. Previous medications include lexapro, wellbutrin, seroquel and prozac.  Stopped taking Prozac and Wellbutrin.  Will start Trintellix 10 mg tablet take 1 tablet daily with breakfast for anxiety and depression.  "

## 2025-05-05 NOTE — PROGRESS NOTES
This provider is located at the [facility] (through [facility]), [address], [place], [address] using a secure [employer] Video Visit through [employer]. Patient is being seen remotely via telehealth at their home address in [place], and stated they are in a secure environment for this session. The patient's condition being diagnosed/treated is appropriate for telemedicine. The provider identified herself as well as her credentials.   The patient, and/or patients guardian, consent to be seen remotely, and when consent is given they understand that the consent allows for patient identifiable information to be sent to a third party as needed.   They may refuse to be seen remotely at any time. The electronic data is encrypted and password protected, and the patient and/or guardian has been advised of the potential risks to privacy not withstanding such measures.    Mode of Visit: Video  Location of patient: -HOME-  Location of provider: +[facility]  You have chosen to receive care through a telehealth visit.  The patient has signed the video visit consent form.  The visit included audio and video interaction. No technical issues occurred during this visit.      Subjective   Debbie Lanier is a 54 y.o. female who presents today for follow up    Chief Complaint:  Depression, anxiety    History of Present Illness:   History of Present Illness   History of Present Illness  The patient is a 54-year-old female who presents via virtual visit for follow-up of depression and anxiety.    She reports that her asthma is bothering her slightly. She has been experiencing significant nausea, which she attributes to the initiation of Trintellix, a medication prescribed for her depression. The severity of the nausea has rendered her unable to continue the medication. She has previously been on Prozac and Wellbutrin, with the latter causing an increase in her depressive symptoms and  tearfulness. She also mentions a past prescription for Seroquel, which she believes was intended to manage restless leg syndrome, but she discontinued its use due to lack of efficacy. She describes experiencing brain fog and forgetfulness. Her sleep pattern is generally good, with increased sleep duration on weekends. She is currently employed at [employer] in [place]. She does not have any cardiac issues. She is no longer taking quetiapine. She quantifies her depression as a 5 or 6 on a scale of 10.    Social History:  - Employed at [employer] in [place]  - Reports increased sleep duration on weekends    Psychiatric History:  - Previous medications: Prozac, Wellbutrin, Seroquel, Lexapro, Trintellix  - Adverse reactions: Severe nausea with Trintellix, increased depressive symptoms with Wellbutrin  - Long-term use of Lexapro (7 to 9 years) for anxiety, not effective for depression    Pertinent Negatives:  - Denies any thoughts of self-harm or harming others  - Denies seeing or hearing things  - No alcohol, drug use, or marijuana    SOCIAL HISTORY  She does not smoke, drink alcohol, or use illicit drugs. She is currently employed at [employer] in [place].    The following portions of the patient's history were reviewed and updated as appropriate: allergies, current medications, past family history, past medical history, past social history, past surgical history and problem list.      Past Medical History:  Past Medical History:   Diagnosis Date    Anxiety     Asthma     Cholelithiasis 07/23    7% function    Colon polyp 05/2019    Depression     Disease of thyroid gland 1998    Hypothyroidism    Diverticulitis of colon 05/ 2020    Mild    Fibrocystic breast 08/2019    GERD (gastroesophageal reflux disease)     Hiatal hernia     Hypertension 11/2018    Take low does of medicine    MVP (mitral valve prolapse)     Panic disorder        Social History:  Social History     Socioeconomic History    Marital status:     Tobacco Use    Smoking status: Never    Smokeless tobacco: Never   Vaping Use    Vaping status: Never Used   Substance and Sexual Activity    Alcohol use: No    Drug use: No    Sexual activity: Not Currently     Partners: Male     Birth control/protection: Tubal ligation       Family History:  Family History   Problem Relation Age of Onset    Thyroid disease Mother     Diabetes Mother         Watches diet    Depression Mother     Anxiety disorder Mother     Bipolar disorder Mother     Dementia Mother     Suicide Attempts Brother     Breast cancer Neg Hx        Past Surgical History:  Past Surgical History:   Procedure Laterality Date    ABDOMINAL SURGERY  9/23    Gallbladder surgery    CHOLECYSTECTOMY N/A 09/07/2023    Procedure: CHOLECYSTECTOMY LAPAROSCOPIC;  Surgeon: Jose M Barreto MD;  Location: [facility];  Service: General;  Laterality: N/A;    COLONOSCOPY  2023    D & C AND LAPAROSCOPY      ENDOSCOPY      HEMORRHOIDECTOMY  5/2005    TONSILLECTOMY      TUBAL ABDOMINAL LIGATION         Problem List:  Patient Active Problem List   Diagnosis    URI (upper respiratory infection)    Status post laparoscopic cholecystectomy        Allergy:   Allergies   Allergen Reactions    Indocin [Indomethacin] Itching    Tramadol Itching        Current Medications:   Current Outpatient Medications   Medication Sig Dispense Refill    azelastine (ASTELIN) 0.1 % nasal spray Administer 2 sprays into the nostril(s) as directed by provider 2 (Two) Times a Day. Use in each nostril as directed      Benralizumab (Fasenra) 30 MG/ML solution prefilled syringe Inject  under the skin into the appropriate area as directed.      Budeson-Glycopyrrol-Formoterol (Breztri Aerosphere) 160-9-4.8 MCG/ACT aerosol inhaler Inhale 2 puffs 2 (Two) Times a Day.      buPROPion XL (Wellbutrin XL) 300 MG 24 hr tablet Take 1 tablet by mouth Every Morning. 30 tablet 1    carvedilol (COREG) 6.25 MG tablet Take 1 tablet by mouth 2 (Two) Times a Day With Meals.  (Patient not taking: Reported on 11/20/2024)      citalopram (CeleXA) 10 MG tablet Take 1 tablet by mouth Daily. 30 tablet 1    Dupilumab (Dupixent) 300 MG/2ML solution pen-injector Inject  under the skin into the appropriate area as directed. (Patient not taking: Reported on 11/20/2024)      estradiol-norethindrone (COMBIPATCH) 0.05-0.14 MG/DAY patch Place 1 patch on the skin as directed by provider 2 (Two) Times a Week. (Patient not taking: Reported on 11/20/2024)      fexofenadine (ALLEGRA) 180 MG tablet Take 1 tablet by mouth Daily.      fluticasone-salmeterol (ADVAIR) 100-50 MCG/DOSE DISKUS Inhale 2 (two) times a day. (Patient not taking: Reported on 11/20/2024)      HYDROcodone-acetaminophen (NORCO) 5-325 MG per tablet Take 1 tablet by mouth every 4 hours as needed for pain. (Patient not taking: Reported on 11/20/2024) 10 tablet 0    ibuprofen (ADVIL,MOTRIN) 800 MG tablet Take 1 tablet by mouth 3 (Three) Times a Day As Needed for Mild Pain , Moderate Pain  or Fever. 30 tablet 0    losartan (COZAAR) 25 MG tablet Take 1 tablet by mouth Daily.      montelukast (SINGULAIR) 10 MG tablet Take 1 tablet by mouth Every Night.      orphenadrine (NORFLEX) 100 MG 12 hr tablet Take 1 tablet by mouth 2 (Two) Times a Day. (Patient not taking: Reported on 11/20/2024)      pantoprazole (PROTONIX) 20 MG EC tablet Take 1 tablet by mouth Daily.      Rimegepant Sulfate (Nurtec) 75 MG tablet dispersible tablet Take  by mouth.      triamcinolone (KENALOG) 0.025 % cream Apply 1 Application topically to the appropriate area as directed 2 (Two) Times a Day.      Vitamin D, Cholecalciferol, (CHOLECALCIFEROL) 10 MCG (400 UNIT) tablet Take 1 tablet by mouth Daily.      ZOLMitriptan (ZOMIG) 5 MG nasal solution into the nostril(s) as directed by provider As Needed for Migraine. (Patient not taking: Reported on 11/20/2024)       No current facility-administered medications for this visit.       Review of Symptoms:    Review of Systems    Psychiatric/Behavioral:  Positive for depressed mood. The patient is nervous/anxious.    All other systems reviewed and are negative.        Physical Exam:   Last menstrual period 09/04/2023.  There is no height or weight on file to calculate BMI.  No height and weight on file for this encounter.    05/08/25  MENTAL STATUS EXAM   General Appearance:  Cleanly groomed and dressed  Eye Contact:  Good eye contact  Attitude:  Cooperative  Motor Activity:  Normal gait, posture  Speech:  Normal rate, tone, volume  Language:  Spontaneous  Mood and affect:  Depressed and anxious  Hopelessness:  Denies  Thought Process:  Goal-directed and linear  Associations/ Thought Content:  No delusions  Hallucinations:  None  Suicidal Ideations:  Not present  Homicidal Ideation:  Not present  Sensorium:  Alert  Orientation:  Person, place, time and situation  Insight:  Fair  Judgement:  Fair  Reliability:  Fair  Impulse Control:  Fair      PHQ-Score Total:  PHQ-9 Total Score: (Patient-Rptd) 16      Lab Results:   No visits with results within 1 Month(s) from this visit.   Latest known visit with results is:   Office Visit on 11/20/2024   Component Date Value Ref Range Status    Total Cholesterol 11/20/2024 200  0 - 200 mg/dL Final    Triglycerides 11/20/2024 110  0 - 150 mg/dL Final    HDL Cholesterol 11/20/2024 56  40 - 60 mg/dL Final    LDL Cholesterol  11/20/2024 124 (H)  0 - 100 mg/dL Final    VLDL Cholesterol 11/20/2024 20  5 - 40 mg/dL Final    LDL/HDL Ratio 11/20/2024 2.18   Final     Results      Assessment & Plan   Problems Addressed this Visit    None  Visit Diagnoses         Severe episode of recurrent major depressive disorder, without psychotic features    -  Primary    Relevant Medications    citalopram (CeleXA) 10 MG tablet      Generalized anxiety disorder        Relevant Medications    citalopram (CeleXA) 10 MG tablet      Adjustment disorder, unspecified type        Relevant Medications    citalopram (CeleXA) 10 MG  tablet      Medication management              Diagnoses         Codes Comments      Severe episode of recurrent major depressive disorder, without psychotic features    -  Primary ICD-10-CM: F33.2  ICD-9-CM: 296.33       Generalized anxiety disorder     ICD-10-CM: F41.1  ICD-9-CM: 300.02       Adjustment disorder, unspecified type     ICD-10-CM: F43.20  ICD-9-CM: 309.9       Medication management     ICD-10-CM: Z79.899  ICD-9-CM: V58.69           Assessment & Plan  Problems:  - Depression  - Anxiety    Content of Therapy:  During the session, the patient discussed her experiences with various psychiatric medications, including Trintellix, Prozac, Wellbutrin, Seroquel, and Lexapro. She reported severe nausea with Trintellix and worsened mood with Wellbutrin. Lexapro was effective for anxiety but did not alleviate her depression. The patient expressed concerns about medication costs and side effects. Strategies for managing brain fog and forgetfulness were also discussed.    Clinical Impression:  The patient presents with moderate depression, rated 5-6/10, and well-managed anxiety. She reports significant gastrointestinal side effects with Trintellix and increased tearfulness with Wellbutrin. Lexapro was effective for anxiety but insufficient for depression. The patient's mental health appears stable, with no current panic attacks or severe anxiety symptoms. She denies any thoughts of self-harm or harm to others, and there is no evidence of substance abuse.    Therapeutic Intervention:  Psychoeducation was provided regarding the potential gastrointestinal effects of SSRIs and the importance of gradual dosage increases.     Plan:  - Start Celexa 10 mg once at night, with a gradual increase in dosage.  - Monitor for gastrointestinal side effects and take medication at night to mitigate nausea.  - Continue current self-care routines and implement mindfulness exercises.    Follow-up:  - Next appointment scheduled for  07/09/2025 at 8:00 AM.  - Goals for the session: Assess response to Celexa, evaluate mood and anxiety levels, and discuss any side effects or concerns.    Notes & Risk Factors:  - No thoughts of self-harm or harm to others.  - No substance abuse.  - Protective factors include stable employment and supportive relationships.       Social History     Tobacco Use   Smoking Status Never   Smokeless Tobacco Never       JOANNE reviewed and appropriate. Patient counseled on use of controlled substances.     -The benefits of a healthy diet and exercise were discussed with patient, especially the positive effects they have on mental health. Patient encouraged to consider lifestyle modification regarding  diet and exercise patterns to maximize results of mental health treatment.  -Reviewed previous available documentation  -Reviewed most recent available labs     -I've explained to her that drugs of the SSRI class can have side effects such as weight gain, sexual dysfunction, insomnia, headache, nausea. These medications are generally effective at alleviating symptoms of anxiety and/or depression. Let me know if significant side effects do occur.    -Previous medications include trintellix, prozac, wellbutrin, seroquel, lexapro    Visit Diagnoses:    ICD-10-CM ICD-9-CM   1. Severe episode of recurrent major depressive disorder, without psychotic features  F33.2 296.33   2. Generalized anxiety disorder  F41.1 300.02   3. Adjustment disorder, unspecified type  F43.20 309.9   4. Medication management  Z79.899 V58.69       TREATMENT PLAN/GOALS: Continue supportive psychotherapy efforts and medications as indicated. Treatment and medication options discussed during today's visit. Patient acknowledged and verbally consented to continue with current treatment plan and was educated on the importance of compliance with treatment and follow-up appointments.    MEDICATION ISSUES:    Discussed medication options and treatment plan of  prescribed medication as well as the risks, benefits, and side effects including potential falls, possible impaired driving and metabolic adversities among others. Patient is agreeable to call the office with any worsening of symptoms or onset of side effects. Patient is agreeable to call 911 or go to the nearest ER should he/she begin having SI/HI.     MEDS ORDERED DURING VISIT:  New Medications Ordered This Visit   Medications    citalopram (CeleXA) 10 MG tablet     Sig: Take 1 tablet by mouth Daily.     Dispense:  30 tablet     Refill:  1       - Discontinue Trintellix  - Start Celexa 10 mg tablet take 1 tablet every day for depression and anxiety    Return in about 2 months (around 7/8/2025).       I spent 30 minutes caring for Debbie on this date of service. This time includes time spent by me in the following activities: preparing for the visit, performing a medically appropriate examination and/or evaluation, counseling and educating the patient/family/caregiver, documenting information in the medical record, and ordering medications    I have personally reviewed this patients note, confirmed and/or updated, this visit as appropriate. History of present illness- interval history, physical examination, assessment and plan, allergies, current medications, past family history, past medical history, past social history, past surgical history and problem list.          This document has been electronically signed by NIYAH Mullen  May 8, 2025 08:24 EDT    Patient or patient representative verbalized consent for the use of Ambient Listening during the visit with  NIYAH Mullen for chart documentation. 5/8/2025  08:02 EDT    Part of this note may be an electronic transcription/translation of spoken language to printed text using the Dragon Dictation System.

## 2025-05-08 ENCOUNTER — TELEMEDICINE (OUTPATIENT)
Dept: PSYCHIATRY | Facility: CLINIC | Age: 55
End: 2025-05-08
Payer: COMMERCIAL

## 2025-05-08 DIAGNOSIS — F43.20 ADJUSTMENT DISORDER, UNSPECIFIED TYPE: ICD-10-CM

## 2025-05-08 DIAGNOSIS — F33.2 SEVERE EPISODE OF RECURRENT MAJOR DEPRESSIVE DISORDER, WITHOUT PSYCHOTIC FEATURES: Primary | ICD-10-CM

## 2025-05-08 DIAGNOSIS — F41.1 GENERALIZED ANXIETY DISORDER: ICD-10-CM

## 2025-05-08 DIAGNOSIS — Z79.899 MEDICATION MANAGEMENT: ICD-10-CM

## 2025-05-08 RX ORDER — CITALOPRAM HYDROBROMIDE 10 MG/1
10 TABLET ORAL DAILY
Qty: 30 TABLET | Refills: 1 | Status: SHIPPED | OUTPATIENT
Start: 2025-05-08 | End: 2026-05-08

## 2025-06-03 RX ORDER — BUPROPION HYDROCHLORIDE 300 MG/1
300 TABLET ORAL EVERY MORNING
Qty: 30 TABLET | Refills: 1 | Status: SHIPPED | OUTPATIENT
Start: 2025-06-03 | End: 2026-06-03

## 2025-06-26 ENCOUNTER — TRANSCRIBE ORDERS (OUTPATIENT)
Dept: ADMINISTRATIVE | Facility: HOSPITAL | Age: 55
End: 2025-06-26
Payer: COMMERCIAL

## 2025-06-26 DIAGNOSIS — J45.41 MODERATE PERSISTENT ASTHMA WITH EXACERBATION: Primary | ICD-10-CM

## 2025-07-09 ENCOUNTER — TELEMEDICINE (OUTPATIENT)
Dept: PSYCHIATRY | Facility: CLINIC | Age: 55
End: 2025-07-09
Payer: COMMERCIAL

## 2025-07-09 DIAGNOSIS — F43.20 ADJUSTMENT DISORDER, UNSPECIFIED TYPE: ICD-10-CM

## 2025-07-09 DIAGNOSIS — Z79.899 MEDICATION MANAGEMENT: ICD-10-CM

## 2025-07-09 DIAGNOSIS — F41.1 GENERALIZED ANXIETY DISORDER: ICD-10-CM

## 2025-07-09 DIAGNOSIS — F33.2 SEVERE EPISODE OF RECURRENT MAJOR DEPRESSIVE DISORDER, WITHOUT PSYCHOTIC FEATURES: Primary | ICD-10-CM

## 2025-07-09 RX ORDER — FLUOXETINE 10 MG/1
10 CAPSULE ORAL DAILY
Qty: 30 CAPSULE | Refills: 0 | Status: SHIPPED | OUTPATIENT
Start: 2025-07-09

## 2025-07-09 NOTE — PROGRESS NOTES
"This provider is located at the [facility] (through [facility]), [address], [facility], [address] using a secure [email or web address] Video Visit through [employer]. Patient is being seen remotely via telehealth at their home address in [place], and stated they are in a secure environment for this session. The patient's condition being diagnosed/treated is appropriate for telemedicine. The provider identified herself as well as her credentials.   The patient, and/or patients guardian, consent to be seen remotely, and when consent is given they understand that the consent allows for patient identifiable information to be sent to a third party as needed.   They may refuse to be seen remotely at any time. The electronic data is encrypted and password protected, and the patient and/or guardian has been advised of the potential risks to privacy not withstanding such measures.    Mode of Visit: Video  Location of patient: -HOME-  Location of provider: +[facility]+  You have chosen to receive care through a telehealth visit.  The patient has signed the video visit consent form.  The visit included audio and video interaction. No technical issues occurred during this visit.      Subjective   Debbie Lanier is a 54 y.o. female who presents today for follow up    Chief Complaint:  Depression and anxeity    History of Present Illness:   History of Present Illness   History of Present Illness  The patient is a 54-year-old female who presents via virtual visit for follow-up of depression and anxiety.    She reports no current issues with anxiety and does not believe she is experiencing depression. However, she mentions feeling extremely fatigued, which she attributes to her medication. She discontinued her medication last Saturday due to this fatigue, which has been so severe that she requires 3 to 4-hour naps. She describes feeling as if she is \"dragging\" and struggles to move from one place to " another. Her sleep schedule typically involves going to bed around 10:00 PM and waking up at 6:15 AM.    Interim History: She has tried various medications including Trintellix, Prozac, Wellbutrin, Seroquel, and Lexapro. She was on Lexapro for about 10 years, which effectively managed her anxiety. However, she did not find Wellbutrin beneficial and did not  her prescription in 06/2025. She has been taking Celexa since 05/2025 but stopped it last Saturday. She recalls that the initial low dose of Prozac was the most effective treatment she has received so far. When the dose of Prozac was increased, she began experiencing unusual dreams.    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    She also reports severe leg pain that persists throughout the day and cramps at night. She has been consuming bananas to increase her potassium intake but is unsure if these symptoms are side effects of her medication. She reports not drinking enough water daily due to her busy schedule.    SOCIAL HISTORY  She works at [employer].    The following portions of the patient's history were reviewed and updated as appropriate: allergies, current medications, past family history, past medical history, past social history, past surgical history and problem list.      Past Medical History:  Past Medical History:   Diagnosis Date    Anxiety     Asthma     Cholelithiasis 07/23    7% function    Colon polyp 05/2019    Depression     Disease of thyroid gland 1998    Hypothyroidism    Diverticulitis of colon 05/ 2020    Mild    Fibrocystic breast 08/2019    GERD (gastroesophageal reflux disease)     Hiatal hernia     Hypertension 11/2018    Take low does of medicine    MVP (mitral valve prolapse)     Panic disorder        Social History:  Social History     Socioeconomic  History    Marital status:    Tobacco Use    Smoking status: Never    Smokeless tobacco: Never   Vaping Use    Vaping status: Never Used   Substance and Sexual Activity    Alcohol use: No    Drug use: No    Sexual activity: Not Currently     Partners: Male     Birth control/protection: Tubal ligation       Family History:  Family History   Problem Relation Age of Onset    Thyroid disease Mother     Diabetes Mother         Watches diet    Depression Mother     Anxiety disorder Mother     Bipolar disorder Mother     Dementia Mother     Suicide Attempts Brother     Breast cancer Neg Hx        Past Surgical History:  Past Surgical History:   Procedure Laterality Date    ABDOMINAL SURGERY  9/23    Gallbladder surgery    CHOLECYSTECTOMY N/A 09/07/2023    Procedure: CHOLECYSTECTOMY LAPAROSCOPIC;  Surgeon: Jose M Barreto MD;  Location: [facility];  Service: General;  Laterality: N/A;    COLONOSCOPY  2023    D & C AND LAPAROSCOPY      ENDOSCOPY      HEMORRHOIDECTOMY  5/2005    TONSILLECTOMY      TUBAL ABDOMINAL LIGATION         Problem List:  Patient Active Problem List   Diagnosis    URI (upper respiratory infection)    Status post laparoscopic cholecystectomy        Allergy:   Allergies   Allergen Reactions    Indocin [Indomethacin] Itching    Tramadol Itching        Current Medications:   Current Outpatient Medications   Medication Sig Dispense Refill    azelastine (ASTELIN) 0.1 % nasal spray Administer 2 sprays into the nostril(s) as directed by provider 2 (Two) Times a Day. Use in each nostril as directed      Benralizumab (Fasenra) 30 MG/ML solution prefilled syringe Inject  under the skin into the appropriate area as directed.      Budeson-Glycopyrrol-Formoterol (Breztri Aerosphere) 160-9-4.8 MCG/ACT aerosol inhaler Inhale 2 puffs 2 (Two) Times a Day.      buPROPion XL (Wellbutrin XL) 300 MG 24 hr tablet Take 1 tablet by mouth Every Morning. 30 tablet 1    carvedilol (COREG) 6.25 MG tablet Take 1 tablet by  mouth 2 (Two) Times a Day With Meals. (Patient not taking: Reported on 11/20/2024)      Dupilumab (Dupixent) 300 MG/2ML solution pen-injector Inject  under the skin into the appropriate area as directed. (Patient not taking: Reported on 11/20/2024)      estradiol-norethindrone (COMBIPATCH) 0.05-0.14 MG/DAY patch Place 1 patch on the skin as directed by provider 2 (Two) Times a Week. (Patient not taking: Reported on 11/20/2024)      fexofenadine (ALLEGRA) 180 MG tablet Take 1 tablet by mouth Daily.      FLUoxetine (PROzac) 10 MG capsule Take 1 capsule by mouth Daily. 30 capsule 0    fluticasone-salmeterol (ADVAIR) 100-50 MCG/DOSE DISKUS Inhale 2 (two) times a day. (Patient not taking: Reported on 11/20/2024)      HYDROcodone-acetaminophen (NORCO) 5-325 MG per tablet Take 1 tablet by mouth every 4 hours as needed for pain. (Patient not taking: Reported on 11/20/2024) 10 tablet 0    ibuprofen (ADVIL,MOTRIN) 800 MG tablet Take 1 tablet by mouth 3 (Three) Times a Day As Needed for Mild Pain , Moderate Pain  or Fever. 30 tablet 0    losartan (COZAAR) 25 MG tablet Take 1 tablet by mouth Daily.      montelukast (SINGULAIR) 10 MG tablet Take 1 tablet by mouth Every Night.      orphenadrine (NORFLEX) 100 MG 12 hr tablet Take 1 tablet by mouth 2 (Two) Times a Day. (Patient not taking: Reported on 11/20/2024)      pantoprazole (PROTONIX) 20 MG EC tablet Take 1 tablet by mouth Daily.      Rimegepant Sulfate (Nurtec) 75 MG tablet dispersible tablet Take  by mouth.      triamcinolone (KENALOG) 0.025 % cream Apply 1 Application topically to the appropriate area as directed 2 (Two) Times a Day.      Vitamin D, Cholecalciferol, (CHOLECALCIFEROL) 10 MCG (400 UNIT) tablet Take 1 tablet by mouth Daily.      ZOLMitriptan (ZOMIG) 5 MG nasal solution into the nostril(s) as directed by provider As Needed for Migraine. (Patient not taking: Reported on 11/20/2024)       No current facility-administered medications for this visit.       Review  of Symptoms:    Review of Systems   Psychiatric/Behavioral:  Positive for sleep disturbance, depressed mood and stress. Negative for dysphoric mood, hallucinations, self-injury and suicidal ideas. The patient is nervous/anxious.    All other systems reviewed and are negative.        Physical Exam:   Last menstrual period 09/04/2023.  There is no height or weight on file to calculate BMI.  No height and weight on file for this encounter.    07/09/25  MENTAL STATUS EXAM   General Appearance:  Cleanly groomed and dressed  Eye Contact:  Good eye contact  Attitude:  Cooperative  Motor Activity:  Normal gait, posture  Speech:  Normal rate, tone, volume  Language:  Spontaneous  Mood and affect:  Depressed  Hopelessness:  Denies  Thought Process:  Goal-directed and linear  Associations/ Thought Content:  No delusions  Hallucinations:  None  Suicidal Ideations:  Not present  Homicidal Ideation:  Not present  Sensorium:  Alert  Orientation:  Person, place, time and situation  Insight:  Fair  Judgement:  Fair  Reliability:  Fair  Impulse Control:  Fair      PHQ-9 Total Score: 7          Lab Results:   No visits with results within 1 Month(s) from this visit.   Latest known visit with results is:   Office Visit on 11/20/2024   Component Date Value Ref Range Status    Total Cholesterol 11/20/2024 200  0 - 200 mg/dL Final    Triglycerides 11/20/2024 110  0 - 150 mg/dL Final    HDL Cholesterol 11/20/2024 56  40 - 60 mg/dL Final    LDL Cholesterol  11/20/2024 124 (H)  0 - 100 mg/dL Final    VLDL Cholesterol 11/20/2024 20  5 - 40 mg/dL Final    LDL/HDL Ratio 11/20/2024 2.18   Final     Results      Assessment & Plan   Problems Addressed this Visit    None  Visit Diagnoses         Severe episode of recurrent major depressive disorder, without psychotic features    -  Primary    Relevant Medications    FLUoxetine (PROzac) 10 MG capsule    Other Relevant Orders    CBC & Differential    Comprehensive Metabolic Panel    Lipid Panel     TSH    T4, Free    Vitamin B12      Generalized anxiety disorder        Relevant Medications    FLUoxetine (PROzac) 10 MG capsule    Other Relevant Orders    CBC & Differential    Comprehensive Metabolic Panel    Lipid Panel    TSH    T4, Free    Vitamin B12      Adjustment disorder, unspecified type        Relevant Medications    FLUoxetine (PROzac) 10 MG capsule    Other Relevant Orders    CBC & Differential    Comprehensive Metabolic Panel    Lipid Panel    TSH    T4, Free    Vitamin B12      Medication management        Relevant Orders    CBC & Differential    Comprehensive Metabolic Panel    Lipid Panel    TSH    T4, Free    Vitamin B12          Diagnoses         Codes Comments      Severe episode of recurrent major depressive disorder, without psychotic features    -  Primary ICD-10-CM: F33.2  ICD-9-CM: 296.33       Generalized anxiety disorder     ICD-10-CM: F41.1  ICD-9-CM: 300.02       Adjustment disorder, unspecified type     ICD-10-CM: F43.20  ICD-9-CM: 309.9       Medication management     ICD-10-CM: Z79.899  ICD-9-CM: V58.69           Assessment & Plan  Problems:  - Depression  - Anxiety  - Leg cramps    Content of Therapy:  During the session, the patient discussed her current state of fatigue and the discontinuation of Celexa due to suspected side effects. She expressed that her anxiety is currently under control but mentioned experiencing severe leg cramps, potentially due to dehydration. The patient also reviewed her history with various medications, including Trintellix, Prozac, Wellbutrin, Seroquel, and Lexapro. The discussion included reframing thoughts about medication side effects and exploring feelings of exhaustion and physical discomfort.    Clinical Impression:  The patient appears to be experiencing significant fatigue, which she attributes to the side effects of Celexa. She does not report feeling depressed but acknowledges the impact of her tiredness on daily functioning. Her anxiety is  currently well-managed, and she does not report any issues in this area. The leg cramps she experiences may be related to dehydration, as she admits to insufficient water intake. Overall, the patient is responsive to treatment adjustments and is willing to follow recommendations for further evaluation.    Therapeutic Intervention:  The session included a review of the patient's medication history and the decision to restart Prozac at a low dose. The clinician provided guidance on increasing water intake to address leg cramps and discussed the importance of a thorough evaluation through ordered blood tests.    Plan:  - Restart Prozac at a low dose and continue for 2 months.  - Consider adding Wellbutrin if Prozac is well-tolerated.  - Increase water intake to address leg cramps.  - Complete ordered blood tests: CBC, CMP, TSH, T4, lipid panel, and vitamin B12.    Follow-up:  - Schedule a follow-up appointment in 2 months to assess the response to Prozac and consider adding Wellbutrin if appropriate.    Notes & Risk Factors:  - Risk factors: Potential dehydration contributing to leg cramps.  - Protective factors: Willingness to follow treatment recommendations and complete blood tests.       Social History     Tobacco Use   Smoking Status Never   Smokeless Tobacco Never       JOANNE reviewed and appropriate. Patient counseled on use of controlled substances.     -The benefits of a healthy diet and exercise were discussed with patient, especially the positive effects they have on mental health. Patient encouraged to consider lifestyle modification regarding  diet and exercise patterns to maximize results of mental health treatment.  -Reviewed previous available documentation  -Reviewed most recent available labs     -I've explained to her that drugs of the SSRI class can have side effects such as weight gain, sexual dysfunction, insomnia, headache, nausea. These medications are generally effective at alleviating  symptoms of anxiety and/or depression. Let me know if significant side effects do occur.     -Previous medications include trintellix, prozac, wellbutrin, seroquel, lexapro    Visit Diagnoses:    ICD-10-CM ICD-9-CM   1. Severe episode of recurrent major depressive disorder, without psychotic features  F33.2 296.33   2. Generalized anxiety disorder  F41.1 300.02   3. Adjustment disorder, unspecified type  F43.20 309.9   4. Medication management  Z79.899 V58.69       TREATMENT PLAN/GOALS: Continue supportive psychotherapy efforts and medications as indicated. Treatment and medication options discussed during today's visit. Patient acknowledged and verbally consented to continue with current treatment plan and was educated on the importance of compliance with treatment and follow-up appointments.    MEDICATION ISSUES:    Discussed medication options and treatment plan of prescribed medication as well as the risks, benefits, and side effects including potential falls, possible impaired driving and metabolic adversities among others. Patient is agreeable to call the office with any worsening of symptoms or onset of side effects. Patient is agreeable to call 911 or go to the nearest ER should he/she begin having SI/HI.     MEDS ORDERED DURING VISIT:  New Medications Ordered This Visit   Medications    FLUoxetine (PROzac) 10 MG capsule     Sig: Take 1 capsule by mouth Daily.     Dispense:  30 capsule     Refill:  0     - D/C Celexa   -Start Prozac 10 mg capsule daily  - CBC, CMP, TSH, T4, lipid panel and vitamin B12      Return in about 2 months (around 9/9/2025).       I spent 30 minutes caring for Debbie on this date of service. This time includes time spent by me in the following activities: preparing for the visit, performing a medically appropriate examination and/or evaluation, counseling and educating the patient/family/caregiver, documenting information in the medical record, and ordering medications    I have  personally reviewed this patients note, confirmed and/or updated, this visit as appropriate. History of present illness- interval history, physical examination, assessment and plan, allergies, current medications, past family history, past medical history, past social history, past surgical history and problem list.          This document has been electronically signed by NIYAH Mullen  July 9, 2025 08:46 EDT    Patient or patient representative verbalized consent for the use of Ambient Listening during the visit with  NIYAH Mullen for chart documentation. 7/9/2025  08:10 EDT    Part of this note may be an electronic transcription/translation of spoken language to printed text using the Dragon Dictation System.

## 2025-08-06 ENCOUNTER — HOSPITAL ENCOUNTER (OUTPATIENT)
Facility: HOSPITAL | Age: 55
Discharge: HOME | End: 2025-08-06
Admitting: NURSE PRACTITIONER
Payer: COMMERCIAL

## 2025-08-06 DIAGNOSIS — F33.2 SEVERE EPISODE OF RECURRENT MAJOR DEPRESSIVE DISORDER, WITHOUT PSYCHOTIC FEATURES: ICD-10-CM

## 2025-08-06 DIAGNOSIS — F43.20 ADJUSTMENT DISORDER, UNSPECIFIED TYPE: ICD-10-CM

## 2025-08-06 DIAGNOSIS — Z79.899 MEDICATION MANAGEMENT: ICD-10-CM

## 2025-08-06 DIAGNOSIS — F41.1 GENERALIZED ANXIETY DISORDER: ICD-10-CM

## 2025-08-06 LAB
BASOPHILS # BLD AUTO: 0.01 10*3/MM3 (ref 0–0.2)
BASOPHILS NFR BLD AUTO: 0.2 % (ref 0–1.5)
DEPRECATED RDW RBC AUTO: 43.3 FL (ref 37–54)
EOSINOPHIL # BLD AUTO: 0 10*3/MM3 (ref 0–0.4)
EOSINOPHIL NFR BLD AUTO: 0 % (ref 0.3–6.2)
ERYTHROCYTE [DISTWIDTH] IN BLOOD BY AUTOMATED COUNT: 14.4 % (ref 12.3–15.4)
HCT VFR BLD AUTO: 43.9 % (ref 34–46.6)
HGB BLD-MCNC: 14.1 G/DL (ref 12–15.9)
IMM GRANULOCYTES # BLD AUTO: 0.01 10*3/MM3 (ref 0–0.05)
IMM GRANULOCYTES NFR BLD AUTO: 0.2 % (ref 0–0.5)
LYMPHOCYTES # BLD AUTO: 1.4 10*3/MM3 (ref 0.7–3.1)
LYMPHOCYTES NFR BLD AUTO: 25 % (ref 19.6–45.3)
MCH RBC QN AUTO: 26.7 PG (ref 26.6–33)
MCHC RBC AUTO-ENTMCNC: 32.1 G/DL (ref 31.5–35.7)
MCV RBC AUTO: 83 FL (ref 79–97)
MONOCYTES # BLD AUTO: 0.53 10*3/MM3 (ref 0.1–0.9)
MONOCYTES NFR BLD AUTO: 9.4 % (ref 5–12)
NEUTROPHILS NFR BLD AUTO: 3.66 10*3/MM3 (ref 1.7–7)
NEUTROPHILS NFR BLD AUTO: 65.2 % (ref 42.7–76)
NRBC BLD AUTO-RTO: 0 /100 WBC (ref 0–0.2)
PLATELET # BLD AUTO: 299 10*3/MM3 (ref 140–450)
PMV BLD AUTO: 12.3 FL (ref 6–12)
RBC # BLD AUTO: 5.29 10*6/MM3 (ref 3.77–5.28)
WBC NRBC COR # BLD AUTO: 5.61 10*3/MM3 (ref 3.4–10.8)

## 2025-08-06 PROCEDURE — 80053 COMPREHEN METABOLIC PANEL: CPT | Performed by: NURSE PRACTITIONER

## 2025-08-06 PROCEDURE — 84439 ASSAY OF FREE THYROXINE: CPT | Performed by: NURSE PRACTITIONER

## 2025-08-06 PROCEDURE — 82607 VITAMIN B-12: CPT | Performed by: NURSE PRACTITIONER

## 2025-08-06 PROCEDURE — 84443 ASSAY THYROID STIM HORMONE: CPT | Performed by: NURSE PRACTITIONER

## 2025-08-06 PROCEDURE — 85025 COMPLETE CBC W/AUTO DIFF WBC: CPT | Performed by: NURSE PRACTITIONER

## 2025-08-06 PROCEDURE — 80061 LIPID PANEL: CPT | Performed by: NURSE PRACTITIONER

## 2025-08-07 LAB
ALBUMIN SERPL-MCNC: 4.2 G/DL (ref 3.5–5.2)
ALBUMIN/GLOB SERPL: 1.2 G/DL
ALP SERPL-CCNC: 54 U/L (ref 39–117)
ALT SERPL W P-5'-P-CCNC: 15 U/L (ref 1–33)
ANION GAP SERPL CALCULATED.3IONS-SCNC: 8 MMOL/L (ref 5–15)
AST SERPL-CCNC: 22 U/L (ref 1–32)
BILIRUB SERPL-MCNC: 0.4 MG/DL (ref 0–1.2)
BUN SERPL-MCNC: 9 MG/DL (ref 6–20)
BUN/CREAT SERPL: 8.9 (ref 7–25)
CALCIUM SPEC-SCNC: 9.1 MG/DL (ref 8.6–10.5)
CHLORIDE SERPL-SCNC: 106 MMOL/L (ref 98–107)
CHOLEST SERPL-MCNC: 194 MG/DL (ref 0–200)
CO2 SERPL-SCNC: 24 MMOL/L (ref 22–29)
CREAT SERPL-MCNC: 1.01 MG/DL (ref 0.57–1)
EGFRCR SERPLBLD CKD-EPI 2021: 66.3 ML/MIN/1.73
GLOBULIN UR ELPH-MCNC: 3.4 GM/DL
GLUCOSE SERPL-MCNC: 92 MG/DL (ref 65–99)
HDLC SERPL-MCNC: 57 MG/DL (ref 40–60)
LDLC SERPL CALC-MCNC: 118 MG/DL (ref 0–100)
LDLC/HDLC SERPL: 2.04 {RATIO}
POTASSIUM SERPL-SCNC: 4.2 MMOL/L (ref 3.5–5.2)
PROT SERPL-MCNC: 7.6 G/DL (ref 6–8.5)
SODIUM SERPL-SCNC: 138 MMOL/L (ref 136–145)
T4 FREE SERPL-MCNC: 1.36 NG/DL (ref 0.92–1.68)
TRIGL SERPL-MCNC: 105 MG/DL (ref 0–150)
TSH SERPL DL<=0.05 MIU/L-ACNC: 2.02 UIU/ML (ref 0.27–4.2)
VIT B12 BLD-MCNC: 222 PG/ML (ref 211–946)
VLDLC SERPL-MCNC: 19 MG/DL (ref 5–40)

## 2025-08-19 RX ORDER — FLUOXETINE 10 MG/1
10 CAPSULE ORAL DAILY
Qty: 30 CAPSULE | Refills: 11 | Status: SHIPPED | OUTPATIENT
Start: 2025-08-19

## (undated) DEVICE — ENDOPATH ELECTROSURGERY PROBE PLUS II PISTOL HAND CONTROL PISTOL GRIP HANDLES WITH SUCTION AND IRRRIGATION FOR HAND CONTROL MONOPOLAR ELCTROSURGERY USE ONLY WITH PROBE PLUS II SHAFTS: Brand: ENDOPATH

## (undated) DEVICE — TROCAR: Brand: KII FIOS FIRST ENTRY

## (undated) DEVICE — PK LAP GEN 70

## (undated) DEVICE — TROCAR: Brand: KII® SLEEVE

## (undated) DEVICE — GLV SURG PREMIERPRO MIC LTX PF SZ7.5 BRN

## (undated) DEVICE — 40595 XL TRENDELENBURG POSITIONING KIT: Brand: 40595 XL TRENDELENBURG POSITIONING KIT

## (undated) DEVICE — ANTIBACTERIAL UNDYED BRAIDED (POLYGLACTIN 910), SYNTHETIC ABSORBABLE SUTURE: Brand: COATED VICRYL

## (undated) DEVICE — APPL CHLORAPREP HI/LITE 26ML ORNG

## (undated) DEVICE — ENDOPATH XCEL BLADELESS TROCARS WITH STABILITY SLEEVES: Brand: ENDOPATH XCEL

## (undated) DEVICE — SUT VIC 2/0 UR6 27IN J602H

## (undated) DEVICE — MONOPOLAR METZENBAUM SCISSOR TIP, DISPOSABLE: Brand: MONOPOLAR METZENBAUM SCISSOR TIP, DISPOSABLE

## (undated) DEVICE — TB ET HI LO CUFF MURPHY 7MM

## (undated) DEVICE — CYSTO/BLADDER IRRIGATION SET, REGULATING CLAMP

## (undated) DEVICE — ENDOPATH ELECTROSURGERY PROBE PLUS II 5MM RIGHT ANGLE MONOPOLAR ELECTROSURGERY SUCTION AND IRRRIGATION SHAFTS WITH RIGHT ANGLE ELECTRODE - USE ONLY WITH PROBE PLUS II HANDLES: Brand: ENDOPATH

## (undated) DEVICE — PATIENT RETURN ELECTRODE, SINGLE-USE, CONTACT QUALITY MONITORING, ADULT, WITH 9FT CORD, FOR PATIENTS WEIGING OVER 33LBS. (15KG): Brand: MEGADYNE